# Patient Record
Sex: FEMALE | Race: ASIAN | NOT HISPANIC OR LATINO | Employment: OTHER | URBAN - METROPOLITAN AREA
[De-identification: names, ages, dates, MRNs, and addresses within clinical notes are randomized per-mention and may not be internally consistent; named-entity substitution may affect disease eponyms.]

---

## 2017-05-12 DIAGNOSIS — R42 DIZZINESS: ICD-10-CM

## 2017-05-12 RX ORDER — NICOTINE POLACRILEX 4 MG/1
20 GUM, CHEWING ORAL DAILY
Qty: 90 TABLET | OUTPATIENT
Start: 2017-05-12

## 2017-05-12 NOTE — TELEPHONE ENCOUNTER
Called and spoke with a pharmacist from [facility] to d/c the omeprazole and that patient needs an appointment if still having acid reflux symptoms.

## 2017-05-25 ENCOUNTER — OFFICE VISIT (OUTPATIENT)
Dept: FAMILY MEDICINE | Facility: CLINIC | Age: 82
End: 2017-05-25

## 2017-05-25 VITALS
HEART RATE: 66 BPM | TEMPERATURE: 97.9 F | BODY MASS INDEX: 23.13 KG/M2 | SYSTOLIC BLOOD PRESSURE: 166 MMHG | RESPIRATION RATE: 16 BRPM | WEIGHT: 107.2 LBS | HEIGHT: 57 IN | DIASTOLIC BLOOD PRESSURE: 75 MMHG | OXYGEN SATURATION: 98 %

## 2017-05-25 DIAGNOSIS — R52 GENERALIZED PAIN: ICD-10-CM

## 2017-05-25 DIAGNOSIS — I10 ESSENTIAL HYPERTENSION: Primary | ICD-10-CM

## 2017-05-25 DIAGNOSIS — K21.9 GASTROESOPHAGEAL REFLUX DISEASE, ESOPHAGITIS PRESENCE NOT SPECIFIED: ICD-10-CM

## 2017-05-25 DIAGNOSIS — Z23 IMMUNIZATION DUE: ICD-10-CM

## 2017-05-25 RX ORDER — ACETAMINOPHEN 325 MG/1
650 TABLET ORAL EVERY 4 HOURS PRN
Qty: 100 TABLET | Refills: 1 | Status: SHIPPED | OUTPATIENT
Start: 2017-05-25 | End: 2017-08-14

## 2017-05-25 RX ORDER — NICOTINE POLACRILEX 4 MG/1
20 GUM, CHEWING ORAL DAILY
Qty: 90 TABLET | Status: CANCELLED | OUTPATIENT
Start: 2017-05-25

## 2017-05-25 RX ORDER — LOSARTAN POTASSIUM 50 MG/1
50 TABLET ORAL DAILY
Qty: 30 TABLET | Refills: 3 | Status: SHIPPED | OUTPATIENT
Start: 2017-05-25 | End: 2017-07-20

## 2017-05-25 NOTE — PROGRESS NOTES
"       HPI:       Ranjeet Toure is a 82 year old  female with a significant past medical history of HTN who presents for the new concern(s) of:    #Med refills:  Out of meds for several months.  Not taking BP meds, also out of omeprazole  -No headaches, chest pain or blurry vision  -Lives at home with family    GI hx:  No history of ulcer or GI bleed, on omeprazole since 2015.  No reflux symptoms since being off of this the last several months.  No black/bloody stools, no coffee ground emesis/hematemesis         PMHX:     Patient Active Problem List   Diagnosis     Benign essential hypertension       Current Outpatient Prescriptions   Medication Sig Dispense Refill   Not taking medications currently     Allergies   Allergen Reactions     No Known Allergies        No results found for this or any previous visit (from the past 24 hour(s)).         Review of Systems:   5-Point Review of Systems Negative -- Except as noted above.          Physical Exam:     Vitals:    05/25/17 1011   BP: 166/75   Pulse: 66   Resp: 16   Temp: 97.9  F (36.6  C)   TempSrc: Oral   SpO2: 98%   Weight: 107 lb 3.2 oz (48.6 kg)   Height: 4' 9\" (144.8 cm)     Body mass index is 23.2 kg/(m^2).    Gen alert NAD  Heart rrr no murmurs  Lungs clear no wheezing or crackles  Abd soft nontender  Ext no swelling or calf tenderness  Neuro very hard of hearing, CN grossly normal, ambulating with cane      Assessment and Plan     (I10) Essential hypertension  (primary encounter diagnosis)  Comment: out of meds so elevated today.  She is rather frail and BP 160s so will start with losartan only.  Had been on amlodipine + losartan in the past but will start with 1 agent to avoid orthostasis/falls  Plan: losartan (COZAAR) 50 MG tablet        F/u 1 month for BP check and BMP    (K21.9) Gastroesophageal reflux disease, esophagitis presence not specified  Comment: on omeprazole since 2015, no indication to continue.  Reviewed risk of fractures with long term use, " recommend trial Zantac instead.  Pt agrees  Plan: ranitidine (ZANTAC) 300 MG tablet            Health maintenance:  Due for PCV 13 today, given        Options for treatment and follow-up care were reviewed with the patient and/or guardian. Ku Paw and/or guardian engaged in the decision making process and verbalized understanding of the options discussed and agreed with the final plan.      Shayy Foote MD      Precepted today with: Dr Segovia

## 2017-05-25 NOTE — MR AVS SNAPSHOT
After Visit Summary   2017    Ranjeet Toure    MRN: [id number]           Patient Information     Date Of Birth          1935        Visit Information        Provider Department      2017 10:00 AM Shayy Foote MD [facility]        Today's Diagnoses     Essential hypertension    -  1    Gastroesophageal reflux disease, esophagitis presence not specified        Generalized pain        Immunization due           Follow-ups after your visit        Who to contact     Please call your clinic at [phone] to:    Ask questions about your health    Make or cancel appointments    Discuss your medicines    Learn about your test results    Speak to your doctor   If you have compliments or concerns about an experience at your clinic, or if you wish to file a complaint, please contact [facility] Patient Relations at [phone] or email us at [email or web address]         Additional Information About Your Visit        MyChart Information     BuyBoxt is an electronic gateway that provides easy, online access to your medical records. With TradeBeam, you can request a clinic appointment, read your test results, renew a prescription or communicate with your care team.     To sign up for BuyBoxt visit the website at [email or web address]   You will be asked to enter the access code listed below, as well as some personal information. Please follow the directions to create your username and password.     Your access code is: [email or web address]  Expires: 2017 10:46 AM     Your access code will  in 90 days. If you need help or a new code, please contact your [facility] or call [phone] for assistance.        Care EveryWhere ID     This is your Care EveryWhere ID. This could be used by other organizations to access your [place] medical records  [id number]        Your Vitals Were     Pulse Temperature Respirations  "Height Pulse Oximetry BMI (Body Mass Index)    66 97.9  F (36.6  C) (Oral) 16 4' 9\" (144.8 cm) 98% 23.2 kg/m2       Blood Pressure from Last 3 Encounters:   05/25/17 166/75   05/23/16 133/76   04/25/16 181/74    Weight from Last 3 Encounters:   05/25/17 107 lb 3.2 oz (48.6 kg)   05/23/16 110 lb (49.9 kg)   04/25/16 107 lb 12.8 oz (48.9 kg)              We Performed the Following     ADMIN VACCINE, INITIAL     Pneumococcal vaccine 13 valent PCV13 IM (Prevnar) [52113]          Today's Medication Changes          These changes are accurate as of: 5/25/17 10:46 AM.  If you have any questions, ask your nurse or doctor.               Start taking these medicines.        Dose/Directions    ranitidine 300 MG tablet   Commonly known as:  ZANTAC   Used for:  Gastroesophageal reflux disease, esophagitis presence not specified   Started by:  Shayy Foote MD        Dose:  300 mg   Take 1 tablet (300 mg) by mouth At Bedtime   Quantity:  30 tablet   Refills:  1         These medicines have changed or have updated prescriptions.        Dose/Directions    * acetaminophen 325 MG tablet   Commonly known as:  TYLENOL   This may have changed:  Another medication with the same name was added. Make sure you understand how and when to take each.   Used for:  Knee pain   Changed by:  Shayy Foote MD        Dose:  650 mg   Take 2 tablets (650 mg) by mouth every 6 hours as needed for mild pain   Quantity:  100 tablet   Refills:  0       * acetaminophen 325 MG tablet   Commonly known as:  TYLENOL   This may have changed:  You were already taking a medication with the same name, and this prescription was added. Make sure you understand how and when to take each.   Used for:  Generalized pain   Changed by:  Shayy Foote MD        Dose:  650 mg   Take 2 tablets (650 mg) by mouth every 4 hours as needed for mild pain   Quantity:  100 tablet   Refills:  1       losartan 50 MG tablet   Commonly known as:  COZAAR   This may have " changed:    - medication strength  - how much to take   Used for:  Essential hypertension   Changed by:  Shayy Foote MD        Dose:  50 mg   Take 1 tablet (50 mg) by mouth daily   Quantity:  30 tablet   Refills:  3       * Notice:  This list has 2 medication(s) that are the same as other medications prescribed for you. Read the directions carefully, and ask your doctor or other care provider to review them with you.      Stop taking these medicines if you haven't already. Please contact your care team if you have questions.     amLODIPine 5 MG tablet   Commonly known as:  NORVASC   Stopped by:  Shayy Foote MD           ipratropium - albuterol 0.5 mg/2.5 mg/3 mL 0.5-2.5 (3) MG/3ML neb solution   Commonly known as:  DUONEB   Stopped by:  Shayy Foote MD           omeprazole 20 MG tablet   Stopped by:  Shayy Foote MD           order for DME   Stopped by:  Shayy Foote MD           potassium chloride 10 MEQ tablet   Commonly known as:  K-TAB,KLOR-CON   Stopped by:  Shayy Foote MD           SENNA PLUS PO   Stopped by:  Shayy Foote MD           simvastatin 20 MG tablet   Commonly known as:  ZOCOR   Stopped by:  Shayy Foote MD                Where to get your medicines      These medications were sent to [id number] - [place], [place] - [address]  [address]  [address] , [place] [address]     Phone:  [phone]     acetaminophen 325 MG tablet    losartan 50 MG tablet    ranitidine 300 MG tablet                Primary Care Provider Office Phone # Fax #    Shayy Foote [phone]       [facility] [address] [address]        Thank you!     Thank you for choosing [facility]  for your care. Our goal is always to provide you with excellent care. Hearing back from our patients is one way we can continue to improve our services. Please take a few minutes to complete  the written survey that you may receive in the mail after your visit with us. Thank you!             Your Updated Medication List - Protect others around you: Learn how to safely use, store and throw away your medicines at www.disposemymeds.org.          This list is accurate as of: 5/25/17 10:46 AM.  Always use your most recent med list.                   Brand Name Dispense Instructions for use    * acetaminophen 325 MG tablet    TYLENOL    100 tablet    Take 2 tablets (650 mg) by mouth every 6 hours as needed for mild pain       * acetaminophen 325 MG tablet    TYLENOL    100 tablet    Take 2 tablets (650 mg) by mouth every 4 hours as needed for mild pain       losartan 50 MG tablet    COZAAR    30 tablet    Take 1 tablet (50 mg) by mouth daily       ranitidine 300 MG tablet    ZANTAC    30 tablet    Take 1 tablet (300 mg) by mouth At Bedtime       * Notice:  This list has 2 medication(s) that are the same as other medications prescribed for you. Read the directions carefully, and ask your doctor or other care provider to review them with you.

## 2017-05-25 NOTE — PROGRESS NOTES
Preceptor Attestation:  Patient's case reviewed and discussed with Shayy Foote MD Patient seen and discussed with the resident.. I agree with assessment and plan of care.  Supervising Physician:  Doris Segovia DO  [facility]

## 2017-07-20 ENCOUNTER — CARE COORDINATION (OUTPATIENT)
Dept: FAMILY MEDICINE | Facility: CLINIC | Age: 82
End: 2017-07-20

## 2017-07-20 ENCOUNTER — OFFICE VISIT (OUTPATIENT)
Dept: FAMILY MEDICINE | Facility: CLINIC | Age: 82
End: 2017-07-20

## 2017-07-20 VITALS
TEMPERATURE: 98.2 F | HEART RATE: 67 BPM | DIASTOLIC BLOOD PRESSURE: 77 MMHG | BODY MASS INDEX: 23.73 KG/M2 | WEIGHT: 110 LBS | OXYGEN SATURATION: 95 % | HEIGHT: 57 IN | SYSTOLIC BLOOD PRESSURE: 147 MMHG

## 2017-07-20 DIAGNOSIS — I10 ESSENTIAL HYPERTENSION: ICD-10-CM

## 2017-07-20 DIAGNOSIS — K21.9 GASTROESOPHAGEAL REFLUX DISEASE, ESOPHAGITIS PRESENCE NOT SPECIFIED: ICD-10-CM

## 2017-07-20 PROBLEM — H04.129 TEAR FILM INSUFFICIENCY: Status: ACTIVE | Noted: 2017-07-20

## 2017-07-20 PROBLEM — H91.90 HEARING LOSS: Status: ACTIVE | Noted: 2017-07-20

## 2017-07-20 PROBLEM — N18.9 CHRONIC RENAL FAILURE: Status: ACTIVE | Noted: 2017-07-20

## 2017-07-20 PROBLEM — B18.1 CHRONIC HEPATITIS B VIRUS INFECTION (H): Status: ACTIVE | Noted: 2017-07-20

## 2017-07-20 LAB
BUN SERPL-MCNC: 20 MG/DL (ref 7–30)
CALCIUM SERPL-MCNC: 8.7 MG/DL (ref 8.5–10.4)
CHLORIDE SERPLBLD-SCNC: 105 MMOL/L (ref 94–109)
CO2 SERPL-SCNC: 25 MMOL/L (ref 20–32)
CREAT SERPL-MCNC: 1.1 MG/DL (ref 0.6–1.3)
EGFR CALCULATED (BLACK REFERENCE): 61.2 ML/MIN
EGFR CALCULATED (NON BLACK REFERENCE): 50.5 ML/MIN
GLUCOSE SERPL-MCNC: 141 MG/DL (ref 60–109)
POTASSIUM SERPL-SCNC: 3.2 MMOL/L (ref 3.4–5.3)
SODIUM SERPL-SCNC: 134 MMOL/L (ref 133–144)

## 2017-07-20 RX ORDER — DIPHENHYDRAMINE HCL 25 MG
1-2 CAPSULE ORAL 4 TIMES DAILY PRN
COMMUNITY
End: 2022-07-13

## 2017-07-20 RX ORDER — LOSARTAN POTASSIUM 50 MG/1
25 TABLET ORAL DAILY
Qty: 30 TABLET | Refills: 3 | Status: SHIPPED | OUTPATIENT
Start: 2017-07-20 | End: 2017-08-14

## 2017-07-20 RX ORDER — ALBUTEROL SULFATE 90 UG/1
2 AEROSOL, METERED RESPIRATORY (INHALATION) EVERY 4 HOURS PRN
COMMUNITY
Start: 2015-03-24 | End: 2018-09-18

## 2017-07-20 NOTE — PROGRESS NOTES
Preceptor Attestation:  Patient's case reviewed and discussed with Amie Lockwood MD resident and I evaluated the patient. I agree with written assessment and plan of care.  Supervising Physician:  Fouzia Brand   [facility]

## 2017-07-20 NOTE — PROGRESS NOTES
Met with pt in clinic per MD request-  Pt had been non compliant with medications concerned when BP was high.  Insurance had gone inactive and for this reason pt had not been taking medication, insurance is now active again- Called the pharmacy to verify and to see if there were any other barriers preventing dispensing medications to pt  No concerns     Communicated with pt via  in clinic today    Lanceetz

## 2017-07-20 NOTE — PROGRESS NOTES
BMP fairly unremarkable other than GFR of 50.5; has fluctuated in the past and mildly low K of 3.2. Glucose elevated. Repeat BMP and add HgbA1c at follow up visit.

## 2017-07-20 NOTE — PROGRESS NOTES
KATIE Toure is a 82 year old female with a history of HTN, GERD, bilateral OA of the knees, who presents for:  Chief Complaint   Patient presents with     Hypertension     follow up     HTN  Patient was last seen 5/25/17 at which time she was restarted on Losartan 50mg daily. Patient was previously on Losartan and Amlodipine, ran out of medication, and was restarted on only the losartan given she is rather frail and BP in the 160's.    Since last visit,    HTN:  - pt's BP today was 147/77 - pt hasn't been on medications for 3 weeks now due to insurance problems   - before that, pt reports taking her medication everyday   - endorses dizzness when standing up and/or sitting down   - Fell twice 3 months ago after standing up in the: 1) bathroom 2) bedroom   - water intake: 1 cup/day   - no HA, leg swelling, chest pain      GERD:  - medication seems to help when she took it back in May  - endorses burning, chest pain from the acid reflux  - denies vomiting or coffee ground (bloody) emesis   - no black/bloody stools  - no weight loss (gained 3 lbs since last visit)      Social history:  Currently lives with her brother's family (12 members total)  Receives help taking her medications from her nieces and nephews      A Kyron  was used for this visit.     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     Priority: Medium     Overview:   Created by Conversion    Replacement Utility updated for latest IMO load       Hearing loss 07/20/2017     Priority: Medium     Overview:   Created by Conversion    Replacement Utility updated for latest IMO load       Osteoarthrosis involving lower leg 07/20/2017     Priority: Medium     Overview:   Created by Conversion    Replacement Utility updated for  "latest IMO load       Benign essential hypertension 07/22/2015     Priority: Medium       Current Outpatient Prescriptions on File Prior to Visit:  acetaminophen (TYLENOL) 325 MG tablet Take 2 tablets (650 mg) by mouth every 4 hours as needed for mild pain   [DISCONTINUED] losartan (COZAAR) 50 MG tablet Take 1 tablet (50 mg) by mouth daily     No current facility-administered medications on file prior to visit.        Allergies   Allergen Reactions     No Known Allergies             Review of Systems:   Complete ROS completed, negative except for as above in HPI            Physical Exam:     Vitals:    07/20/17 1425 07/20/17 1427   BP: 156/75 147/77   BP Location: Right arm Right arm   Patient Position: Chair Chair   Cuff Size: Adult Regular Adult Regular   Pulse: 67    Temp: 98.2  F (36.8  C)    TempSrc: Oral    SpO2: 95%    Weight: 110 lb (49.9 kg)    Height: 4' 9\" (144.8 cm)      Body mass index is 23.8 kg/(m^2).  Vitals were reviewed and were normal except for mildly elevated BP    General: Alert and orientated in NAD. Pleasant and cooperative.   HEENT: EOMI, sclera without injection or icterus. Mucus membranes moist  Cards: RRR, no murmurs, rubs or gallops. No lower extremity edema  Resp: clear vesicular breath sounds bilaterally, no crackles or wheezes. No increased work of breathing  MSK: moving all extremities w/o difficulty or deficit  Skin: no rashes, lesions, or lacerations  Psych: mood good, affect appropriate    Assessment and Plan     HTN  Given patient's age, BP goal is <150/90 (creatinine and GFR unremarkable even though chronic renal failure noted in problem list, no hx of DM). Patient has had a couple falls and some orthostatic symptoms, however, orthostatics here in clinic were wnl. BP today above goal, above goal in May as well. Previously prescribed Losartan 50mg daily but has not taken in the last few weeks. Fine balance between risks and benefits for this patient.  - losartan (COZAAR) 50 MG " tablet; Take 0.5 tablets (25 mg) by mouth daily  Dispense: 30 tablet; Refill: 3  - [facility] - Results < 1 hr  - Misc. Devices (TABLET CUTTER) MISC; 1 Units as needed  Dispense: 1 each; Refill: 1    2. Gastroesophageal reflux disease, esophagitis presence not specified  No red alarm symptoms.  - ranitidine (ZANTAC) 300 MG tablet; Take 1 tablet (300 mg) by mouth At Bedtime  Dispense: 30 tablet; Refill: 1      Options for treatment and follow-up care were reviewed with the patient. Ranjeet Toure  engaged in the decision making process and verbalized understanding of the options discussed and agreed with the final plan.    Miguel Rubi, MS3 acting as a medical scribe for Dr. Fabián CAPPS (Amie Lockwood) have reviewed the documentation and am in agreement.    Precepted with: Dr. Sunday Lockwood MD (PGY2)  Pager: [phone]  [facility] Resident

## 2017-07-20 NOTE — MR AVS SNAPSHOT
After Visit Summary   2017    Ranjeet Toure    MRN: [id number]           Patient Information     Date Of Birth          1935        Visit Information        Provider Department      2017 2:20 PM Amie Lockwood MD [facility]        Today's Diagnoses     Essential hypertension        Gastroesophageal reflux disease, esophagitis presence not specified           Follow-ups after your visit        Who to contact     Please call your clinic at [phone] to:    Ask questions about your health    Make or cancel appointments    Discuss your medicines    Learn about your test results    Speak to your doctor   If you have compliments or concerns about an experience at your clinic, or if you wish to file a complaint, please contact [facility] Patient Relations at [phone] or email us at [email or web address]         Additional Information About Your Visit        MyChart Information     Shopzilla is an electronic gateway that provides easy, online access to your medical records. With Shopzilla, you can request a clinic appointment, read your test results, renew a prescription or communicate with your care team.     To sign up for Anchiva Systemst visit the website at [email or web address]   You will be asked to enter the access code listed below, as well as some personal information. Please follow the directions to create your username and password.     Your access code is: [email or web address]  Expires: 2017 10:46 AM     Your access code will  in 90 days. If you need help or a new code, please contact your [facility] or call [phone] for assistance.        Care EveryWhere ID     This is your Care EveryWhere ID. This could be used by other organizations to access your [place] medical records  [id number]        Your Vitals Were     Pulse Temperature Height Pulse Oximetry BMI (Body Mass Index)       67 98.2  F  "(36.8  C) (Oral) 4' 9\" (144.8 cm) 95% 23.8 kg/m2        Blood Pressure from Last 3 Encounters:   07/20/17 147/77   05/25/17 166/75   05/23/16 133/76    Weight from Last 3 Encounters:   07/20/17 110 lb (49.9 kg)   05/25/17 107 lb 3.2 oz (48.6 kg)   05/23/16 110 lb (49.9 kg)              We Performed the Following     Basic Metabolic Panel ([facility]) - Results < 1 hr          Today's Medication Changes          These changes are accurate as of: 7/20/17  3:12 PM.  If you have any questions, ask your nurse or doctor.               Start taking these medicines.        Dose/Directions    Tablet Cutter Misc   Used for:  Essential hypertension        Dose:  1 Units   1 Units as needed   Quantity:  1 each   Refills:  1         These medicines have changed or have updated prescriptions.        Dose/Directions    losartan 50 MG tablet   Commonly known as:  COZAAR   This may have changed:  how much to take   Used for:  Essential hypertension        Dose:  25 mg   Take 0.5 tablets (25 mg) by mouth daily   Quantity:  30 tablet   Refills:  3            Where to get your medicines      These medications were sent to [facility] - [place], [place] - [address] [place] [address] RD [address]  [address] , [place] [address]     Phone:  [phone]     losartan 50 MG tablet    ranitidine 300 MG tablet    Tablet Cutter Misc                Primary Care Provider Office Phone # Fax #    Amie Lockwood [phone]       [facility] [address] [place] [address]        Equal Access to Services     [facility] SUDHAKAR AH: Hadii aad ku hadasho Soomaali, waaxda luqadaha, qaybta kaalmada adeegyada, waxhuma colby. So [facility] [phone].    ATENCIÓN: Si habla español, tiene a mooney disposición servicios gratuitos de asistencia lingüística. Jelly reddy [phone].    We comply with applicable federal civil rights laws and [place] laws. We do not discriminate " on the basis of race, color, national origin, age, disability sex, sexual orientation or gender identity.            Thank you!     Thank you for choosing [facility]  for your care. Our goal is always to provide you with excellent care. Hearing back from our patients is one way we can continue to improve our services. Please take a few minutes to complete the written survey that you may receive in the mail after your visit with us. Thank you!             Your Updated Medication List - Protect others around you: Learn how to safely use, store and throw away your medicines at www.disposemymeds.org.          This list is accurate as of: 7/20/17  3:12 PM.  Always use your most recent med list.                   Brand Name Dispense Instructions for use Diagnosis    acetaminophen 325 MG tablet    TYLENOL    100 tablet    Take 2 tablets (650 mg) by mouth every 4 hours as needed for mild pain    Generalized pain       albuterol 108 (90 BASE) MCG/ACT Inhaler    PROAIR HFA/PROVENTIL HFA/VENTOLIN HFA     Inhale 2 puffs into the lungs every 4 hours as needed        hypromellose-dextran 0.3-0.1% opthalmic solution      Apply 1-2 drops to eye 4 times daily as needed        losartan 50 MG tablet    COZAAR    30 tablet    Take 0.5 tablets (25 mg) by mouth daily    Essential hypertension       ranitidine 300 MG tablet    ZANTAC    30 tablet    Take 1 tablet (300 mg) by mouth At Bedtime    Gastroesophageal reflux disease, esophagitis presence not specified       Tablet Cutter Misc     1 each    1 Units as needed    Essential hypertension

## 2017-08-14 ENCOUNTER — OFFICE VISIT (OUTPATIENT)
Dept: FAMILY MEDICINE | Facility: CLINIC | Age: 82
End: 2017-08-14

## 2017-08-14 VITALS
HEART RATE: 64 BPM | SYSTOLIC BLOOD PRESSURE: 151 MMHG | WEIGHT: 111 LBS | TEMPERATURE: 98 F | HEIGHT: 56 IN | DIASTOLIC BLOOD PRESSURE: 80 MMHG | BODY MASS INDEX: 24.97 KG/M2 | OXYGEN SATURATION: 97 % | RESPIRATION RATE: 18 BRPM

## 2017-08-14 DIAGNOSIS — M54.5 CHRONIC MIDLINE LOW BACK PAIN, WITH SCIATICA PRESENCE UNSPECIFIED: ICD-10-CM

## 2017-08-14 DIAGNOSIS — G89.29 CHRONIC MIDLINE LOW BACK PAIN, WITH SCIATICA PRESENCE UNSPECIFIED: ICD-10-CM

## 2017-08-14 DIAGNOSIS — I10 BENIGN ESSENTIAL HYPERTENSION: Primary | ICD-10-CM

## 2017-08-14 RX ORDER — ACETAMINOPHEN 500 MG
1000 TABLET ORAL 2 TIMES DAILY PRN
Qty: 100 TABLET | Refills: 3 | Status: SHIPPED | OUTPATIENT
Start: 2017-08-14 | End: 2022-07-13

## 2017-08-14 RX ORDER — LOSARTAN POTASSIUM 50 MG/1
50 TABLET ORAL DAILY
Qty: 30 TABLET | Refills: 3 | Status: SHIPPED | OUTPATIENT
Start: 2017-08-14 | End: 2017-09-12

## 2017-08-14 NOTE — PROGRESS NOTES
KATIE Toure is a 82 year old female who presents for:  Chief Complaint   Patient presents with     RECHECK     BP F/U - No other concerns.      Back Pain     Pain is getting worse.      HTN  5/25/17: BP was 166/75. Restarted on Losartan 50mg daily. (Patient was previously on Losartan and Amlodipine, ran out of medication, and was restarted on only the losartan given she is rather frail and BP in the 160's.)  7/20/17: BP was 147/77. Restarted back on Losartan 25mg daily as she had been out of the medication since the May visit.  Since that visit she has been taking the medication daily and tolerating it well.  No hypotensive symptoms.    Back pain  Has been episodic over the last several years. Current episode present for the last couple days. No recent fall. No saddle anesthesia, no loss of bowel or bladder, no difficulties with ambulation/leg weakness. Has not tried anything at home.    A Bia  was used for this visit.     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     Priority: Medium     Overview:   Created by Conversion    Replacement Utility updated for latest IMO load       Hearing loss 07/20/2017     Priority: Medium     Overview:   Created by Conversion    Replacement Utility updated for latest IMO load       Osteoarthrosis involving lower leg 07/20/2017     Priority: Medium     Overview:   Created by Conversion    Replacement Utility updated for latest IMO load       Benign essential hypertension 07/22/2015     Priority: Medium         Current Outpatient Prescriptions on File Prior to Visit:  losartan (COZAAR) 50 MG tablet Take 0.5 tablets (25 mg) by mouth daily   ranitidine (ZANTAC) 300 MG tablet Take 1 tablet (300 mg) by mouth At Bedtime   albuterol (PROAIR HFA/PROVENTIL  "HFA/VENTOLIN HFA) 108 (90 BASE) MCG/ACT Inhaler Inhale 2 puffs into the lungs every 4 hours as needed   hypromellose-dextran 0.3-0.1% (ARTIFICIAL TEARS) opthalmic solution Apply 1-2 drops to eye 4 times daily as needed   Misc. Devices (TABLET CUTTER) MISC 1 Units as needed     No current facility-administered medications on file prior to visit.        Allergies   Allergen Reactions     No Known Allergies             Review of Systems:   Complete ROS completed, negative except for as above in HPI            Physical Exam:     Vitals:    08/14/17 1046 08/14/17 1049   BP: 192/75 183/78   BP Location: Right arm Right arm   Patient Position: Chair Chair   Cuff Size: Adult Regular Adult Regular   Pulse: 64    Resp: 18    Temp: 98  F (36.7  C)    TempSrc: Oral    SpO2: 97%    Weight: 111 lb (50.3 kg)    Height: 4' 8\" (142.2 cm)      Body mass index is 24.89 kg/(m^2).  Vital signs normal except for elevated blood pressure.    General: Alert and orientated in NAD. Pleasant and cooperative.   HEENT: EOMI, sclera without injection or icterus. Mucus membranes moist  Cards: RRR, no murmurs, rubs or gallops. No lower extremity edema  Resp: clear vesicular breath sounds bilaterally, no crackles or wheezes. No increased work of breathing  MSK: no point tenderness over the thoracic and lumbar spine. No leg weakness. Possible positive right leg raise.  Skin: no rashes, lesions, or lacerations  Psych: mood good, affect appropriate    Assessment and Plan     1. Benign essential hypertension  BP goal <150/90. Patient nearly at goal today (151/80). Was more elevated during ambulation (192/75). Currently on Losartan 25mg daily and tolerating this well.  - Increase Losartan: losartan (COZAAR) 50 MG tablet; Take 1 tablet (50 mg) by mouth daily  Dispense: 30 tablet; Refill: 3    2. Chronic midline low back pain, with sciatica presence unspecified  No red alarm symptoms. Acute on chronic. No point tenderness. No lower extremity weakness. " "Possible positive left leg raise. Strongly encouraged PT for both back pain and for hx of falls (2 in the last year) and failed \"get up go\" screen today in clinic, but patient refused.  - acetaminophen (TYLENOL) 500 MG tablet; Take 2 tablets (1,000 mg) by mouth 2 times daily as needed for mild pain  Dispense: 100 tablet; Refill: 3    Follow up in clinic in 2-4 weeks.    Options for treatment and follow-up care were reviewed with the patient. Ranjeet Toure  engaged in the decision making process and verbalized understanding of the options discussed and agreed with the final plan.    Precepted with: MD Amie Carreon MD (PGY2)  Pager: [phone]  [facility] Resident          "

## 2017-08-14 NOTE — MR AVS SNAPSHOT
After Visit Summary   2017    Ranjeet Toure    MRN: [id number]           Patient Information     Date Of Birth          1935        Visit Information        Provider Department      2017 10:40 AM Amie Lockwood MD [facility]        Today's Diagnoses     Benign essential hypertension    -  1    Chronic midline low back pain, with sciatica presence unspecified           Follow-ups after your visit        Your next 10 appointments already scheduled     Sep 12, 2017 11:00 AM CDT   Return Visit with Amie Lockwood MD   [facility] ([facility])    [address] [place] [address]   [phone]              Who to contact     Please call your clinic at [phone] to:    Ask questions about your health    Make or cancel appointments    Discuss your medicines    Learn about your test results    Speak to your doctor   If you have compliments or concerns about an experience at your clinic, or if you wish to file a complaint, please contact [facility] Patient Relations at [phone] or email us at [email or web address]         Additional Information About Your Visit        MyChart Information     WiNetworks is an electronic gateway that provides easy, online access to your medical records. With WiNetworks, you can request a clinic appointment, read your test results, renew a prescription or communicate with your care team.     To sign up for WiNetworks visit the website at [email or web address]   You will be asked to enter the access code listed below, as well as some personal information. Please follow the directions to create your username and password.     Your access code is: [email or web address]  Expires: 2017 10:46 AM     Your access code will  in 90 days. If you need help or a new code, please contact your [facility] or call [phone] for assistance.        Care EveryWhere  "ID     This is your Care EveryWhere ID. This could be used by other organizations to access your [facility] medical records  [id number]        Your Vitals Were     Pulse Temperature Respirations Height Pulse Oximetry BMI (Body Mass Index)    64 98  F (36.7  C) (Oral) 18 4' 8\" (142.2 cm) 97% 24.89 kg/m2       Blood Pressure from Last 3 Encounters:   08/14/17 151/80   07/20/17 147/77   05/25/17 166/75    Weight from Last 3 Encounters:   08/14/17 111 lb (50.3 kg)   07/20/17 110 lb (49.9 kg)   05/25/17 107 lb 3.2 oz (48.6 kg)              Today, you had the following     No orders found for display         Today's Medication Changes          These changes are accurate as of: 8/14/17 11:59 PM.  If you have any questions, ask your nurse or doctor.               These medicines have changed or have updated prescriptions.        Dose/Directions    acetaminophen 500 MG tablet   Commonly known as:  TYLENOL   This may have changed:    - medication strength  - how much to take  - when to take this   Used for:  Chronic midline low back pain, with sciatica presence unspecified   Changed by:  Amie Lockwood MD        Dose:  1000 mg   Take 2 tablets (1,000 mg) by mouth 2 times daily as needed for mild pain   Quantity:  100 tablet   Refills:  3       losartan 50 MG tablet   Commonly known as:  COZAAR   This may have changed:  how much to take   Changed by:  Amie Lockwood MD        Dose:  50 mg   Take 1 tablet (50 mg) by mouth daily   Quantity:  30 tablet   Refills:  3            Where to get your medicines      These medications were sent to [id number] - [place], [place] - [address] AT [address]  [address], [place] [address]     Phone:  [phone]     acetaminophen 500 MG tablet    losartan 50 MG tablet                Primary Care Provider Office Phone # Fax #    Amie Lockwood [phone]       [facility] [address]  [place] [address]        Equal Access to Services     [facility] : Hadii ines ku hadlove Somayte, wadonnyda luqadaha, qaybta kaalmada светлана, waxhuma olu steeledavisnate colby. So [facility] [place] [phone].    ATENCIÓN: Si habla español, tiene a mooney disposición servicios gratuitos de asistencia lingüística. Jelly al [phone].    We comply with applicable federal civil rights laws and [place] laws. We do not discriminate on the basis of race, color, national origin, age, disability sex, sexual orientation or gender identity.            Thank you!     Thank you for choosing [facility]  for your care. Our goal is always to provide you with excellent care. Hearing back from our patients is one way we can continue to improve our services. Please take a few minutes to complete the written survey that you may receive in the mail after your visit with us. Thank you!             Your Updated Medication List - Protect others around you: Learn how to safely use, store and throw away your medicines at www.disposemymeds.org.          This list is accurate as of: 8/14/17 11:59 PM.  Always use your most recent med list.                   Brand Name Dispense Instructions for use Diagnosis    acetaminophen 500 MG tablet    TYLENOL    100 tablet    Take 2 tablets (1,000 mg) by mouth 2 times daily as needed for mild pain    Chronic midline low back pain, with sciatica presence unspecified       albuterol 108 (90 BASE) MCG/ACT Inhaler    PROAIR HFA/PROVENTIL HFA/VENTOLIN HFA     Inhale 2 puffs into the lungs every 4 hours as needed        hypromellose-dextran 0.3-0.1% opthalmic solution      Apply 1-2 drops to eye 4 times daily as needed        losartan 50 MG tablet    COZAAR    30 tablet    Take 1 tablet (50 mg) by mouth daily        ranitidine 300 MG tablet    ZANTAC    30 tablet    Take 1 tablet (300 mg) by mouth At Bedtime    Gastroesophageal reflux disease, esophagitis presence not specified       Tablet  [facility]     1 each    1 Units as needed    Essential hypertension

## 2017-08-14 NOTE — NURSING NOTE
name: Teenay Mikael  Language: IZZY  Agency: [facility]  Phone number: [phone]

## 2017-08-14 NOTE — PROGRESS NOTES
Preceptor Attestation:  Patient's case reviewed and discussed with Amie Lockwood MD Patient seen and discussed with the resident.. I agree with assessment and plan of care.  Supervising Physician:  Cody Servin MD  [facility]

## 2017-09-12 ENCOUNTER — OFFICE VISIT (OUTPATIENT)
Dept: FAMILY MEDICINE | Facility: CLINIC | Age: 82
End: 2017-09-12

## 2017-09-12 VITALS
DIASTOLIC BLOOD PRESSURE: 76 MMHG | SYSTOLIC BLOOD PRESSURE: 175 MMHG | TEMPERATURE: 98.6 F | WEIGHT: 109.6 LBS | HEIGHT: 56 IN | BODY MASS INDEX: 24.65 KG/M2 | OXYGEN SATURATION: 98 % | RESPIRATION RATE: 22 BRPM | HEART RATE: 67 BPM

## 2017-09-12 DIAGNOSIS — I10 BENIGN ESSENTIAL HYPERTENSION: Primary | ICD-10-CM

## 2017-09-12 LAB
BUN SERPL-MCNC: 16 MG/DL (ref 7–30)
CALCIUM SERPL-MCNC: 9 MG/DL (ref 8.5–10.4)
CHLORIDE SERPLBLD-SCNC: 105 MMOL/L (ref 94–109)
CO2 SERPL-SCNC: 28 MMOL/L (ref 20–32)
CREAT SERPL-MCNC: 1 MG/DL (ref 0.6–1.3)
EGFR CALCULATED (BLACK REFERENCE): 68.3 ML/MIN
EGFR CALCULATED (NON BLACK REFERENCE): 56.4 ML/MIN
GLUCOSE SERPL-MCNC: 109 MG/DL (ref 60–109)
POTASSIUM SERPL-SCNC: 3.3 MMOL/L (ref 3.4–5.3)
SODIUM SERPL-SCNC: 142 MMOL/L (ref 133–144)

## 2017-09-12 RX ORDER — LOSARTAN POTASSIUM 100 MG/1
100 TABLET ORAL DAILY
Qty: 90 TABLET | Refills: 3 | Status: SHIPPED | OUTPATIENT
Start: 2017-09-12 | End: 2018-05-15

## 2017-09-12 RX ORDER — AMLODIPINE BESYLATE 2.5 MG/1
2.5 TABLET ORAL DAILY
Qty: 90 TABLET | Refills: 3 | Status: SHIPPED | OUTPATIENT
Start: 2017-09-12 | End: 2018-05-15

## 2017-09-12 NOTE — MR AVS SNAPSHOT
"              After Visit Summary   9/12/2017    Ranjete Toure    MRN: [id number]           Patient Information     Date Of Birth          1935        Visit Information        Provider Department      9/12/2017 11:00 AM Amie Lockwood MD [facility]        Today's Diagnoses     Benign essential hypertension    -  1       Follow-ups after your visit        Your next 10 appointments already scheduled     Oct 05, 2017  2:20 PM CDT   Return Visit with Amie Lockwood MD   [facility] ([facility])    [address] [place] [address]   [phone]              Who to contact     Please call your clinic at [phone] to:    Ask questions about your health    Make or cancel appointments    Discuss your medicines    Learn about your test results    Speak to your doctor   If you have compliments or concerns about an experience at your clinic, or if you wish to file a complaint, please contact [facility] Patient Relations at [phone] or email us at [email or web address]         Additional Information About Your Visit        Care EveryWhere ID     This is your Care EveryWhere ID. This could be used by other organizations to access your [facility] medical records  [id number]        Your Vitals Were     Pulse Temperature Respirations Height Pulse Oximetry BMI (Body Mass Index)    67 98.6  F (37  C) 22 4' 8\" (142.2 cm) 98% 24.57 kg/m2       Blood Pressure from Last 3 Encounters:   09/12/17 175/76   08/14/17 151/80   07/20/17 147/77    Weight from Last 3 Encounters:   09/12/17 109 lb 9.6 oz (49.7 kg)   08/14/17 111 lb (50.3 kg)   07/20/17 110 lb (49.9 kg)              We Performed the Following     Basic Metabolic Panel ([facility]) - Results < 1 hr          Today's Medication Changes          These changes are accurate as of: 9/12/17 11:59 PM.  If you have any questions, ask your nurse or doctor.               Start taking these medicines.     "    Dose/Directions    amLODIPine 2.5 MG tablet   Commonly known as:  NORVASC   Used for:  Benign essential hypertension   Started by:  Amie Lockwood MD        Dose:  2.5 mg   Take 1 tablet (2.5 mg) by mouth daily   Quantity:  90 tablet   Refills:  3         These medicines have changed or have updated prescriptions.        Dose/Directions    losartan 100 MG tablet   Commonly known as:  COZAAR   This may have changed:    - medication strength  - how much to take   Used for:  Benign essential hypertension   Changed by:  Amie Lockwood MD        Dose:  100 mg   Take 1 tablet (100 mg) by mouth daily   Quantity:  90 tablet   Refills:  3            Where to get your medicines      These medications were sent to [id number] - [place], [place] - [address] [place] [address] AT [address]  [address] , [place] [address]     Phone:  [phone]     amLODIPine 2.5 MG tablet    losartan 100 MG tablet                Primary Care Provider Office Phone # Fax #    Amie Lockwood [phone]       [facility] [address] [place] [address]        Equal Access to Services     [facility] AH: Hadii aad ku hadasho Soomaali, waaxda luqadaha, qaybta kaalmada adeegyada, johnathan wynnein hayaan cathy oneill . So [facility] [phone].    ATENCIÓN: Si habla español, tiene a mooney disposición servicios gratuitos de asistencia lingüística. LlSelect [facility] [phone].    We comply with applicable federal civil rights laws and [place] laws. We do not discriminate on the basis of race, color, national origin, age, disability sex, sexual orientation or gender identity.            Thank you!     Thank you for choosing [facility]  for your care. Our goal is always to provide you with excellent care. Hearing back from our patients is one way we can continue to improve our services. Please take a few minutes to complete the written survey that you may  receive in the mail after your visit with us. Thank you!             Your Updated Medication List - Protect others around you: Learn how to safely use, store and throw away your medicines at www.disposemymeds.org.          This list is accurate as of: 9/12/17 11:59 PM.  Always use your most recent med list.                   Brand Name Dispense Instructions for use Diagnosis    acetaminophen 500 MG tablet    TYLENOL    100 tablet    Take 2 tablets (1,000 mg) by mouth 2 times daily as needed for mild pain    Chronic midline low back pain, with sciatica presence unspecified       albuterol 108 (90 BASE) MCG/ACT Inhaler    PROAIR HFA/PROVENTIL HFA/VENTOLIN HFA     Inhale 2 puffs into the lungs every 4 hours as needed        amLODIPine 2.5 MG tablet    NORVASC    90 tablet    Take 1 tablet (2.5 mg) by mouth daily    Benign essential hypertension       hypromellose-dextran 0.3-0.1% opthalmic solution      Apply 1-2 drops to eye 4 times daily as needed        losartan 100 MG tablet    COZAAR    90 tablet    Take 1 tablet (100 mg) by mouth daily    Benign essential hypertension       ranitidine 300 MG tablet    ZANTAC    30 tablet    Take 1 tablet (300 mg) by mouth At Bedtime    Gastroesophageal reflux disease, esophagitis presence not specified       Tablet Cutter Misc     1 each    1 Units as needed    Essential hypertension

## 2017-09-12 NOTE — PROGRESS NOTES
"       KATIE Toure is a 82 year old female who presents for:    HTN  5/25/17: BP was 166/75. Prescribed Losartan 50mg daily. (Patient was previously on Losartan and Amlodipine, ran out of medication, and was restarted on only the losartan given she is rather frail and BP in the 160's.) She was noncompliant with this.  7/20/17: BP was 147/77. Restarted back on Losartan 25mg daily as she had been out of the medication since the May visit and BP was only mildly elevated.  8/14/17: BP was 151/80, 192/75. Losartan was increased from 25mg to 50mg daily.    Since then patient has been taking her medication daily. Her niece is present and helps her take her medication. No chest pain, no dyspnea, no orthostatic symptoms while on the blood pressure medication; no hypotensive symptoms.. No new questions or concerns.    A SparkLix  was used for this visit.     ROS: Complete 6 point ROS completed and negative other than stated above.    Social: Niece helps her with her medications and transportation.         Physical Exam:     Vitals:    09/12/17 1054 09/12/17 1057   BP: 183/72 175/76   Pulse: 67    Resp: 22    Temp: 98.6  F (37  C)    SpO2: 98%    Weight: 109 lb 9.6 oz (49.7 kg)    Height: 4' 8\" (142.2 cm)      Body mass index is 24.57 kg/(m^2).  Vital signs normal except for elevated blood pressure.    General: Alert and orientated in NAD. Pleasant and cooperative.   HEENT: EOMI, sclera without injection or icterus. Mucus membranes moist  Cards: RRR, no murmurs, rubs or gallops. No lower extremity edema  Resp: clear vesicular breath sounds bilaterally, no crackles or wheezes. No increased work of breathing  MSK: moving all extremities w/o difficulty or deficit  Skin: no rashes, lesions, or lacerations  Psych: mood good, affect appropriate        Results:       Assessment and Plan     1. Benign essential hypertension  BP goal <150/90. eGFR 50 in July 2017, warranting BP control. BP above goal today at 183/72, " 175/76. Currently on Losartan 50mg daily and tolerating this well with no hypotensive symptoms. No lower extremity edema on examination today.  - losartan (COZAAR) 100 MG tablet; Take 1 tablet (100 mg) by mouth daily  Dispense: 90 tablet; Refill: 3  - amLODIPine (NORVASC) 2.5 MG tablet; Take 1 tablet (2.5 mg) by mouth daily  Dispense: 90 tablet; Refill: 3  - patient advised to discontinue amlodipine if she has any hypotensive symptoms. Niece was in agreement.  - Basic Metabolic Panel ([facility]) - Results < 1 hr    Follow up in 2 weeks for further BP management.    Options for treatment and follow-up care were reviewed with the patient. Ranjeet Toure  engaged in the decision making process and verbalized understanding of the options discussed and agreed with the final plan.    Precepted with: MD Amie Ferguson MD (PGY2)  Pager: [phone]  [facility] Resident

## 2017-09-12 NOTE — NURSING NOTE
name: Velia  Language: Cris  Agency: [facility]  Phone number: [phone]

## 2017-09-12 NOTE — LETTER
September 12, 2017      Ranjeet Paw  [address] [place] [address]        Dear Ranjeet,    Your blood levels we obtained in clinic today looked good. Your electrolytes and kidneys are doing well and improved with the blood pressure medications we have started. Continue with your current plan of care.    Please see below for your test results.    Resulted Orders   Basic Metabolic Panel ([facility]) - Results < 1 hr   Result Value Ref Range    Glucose 109.0 60.0 - 109.0 mg/dL    Urea Nitrogen 16.0 7.0 - 30.0 mg/dL    Creatinine 1.0 0.6 - 1.3 mg/dL    Sodium 142.0 133.0 - 144.0 mmol/L    Potassium 3.3 (L) 3.4 - 5.3 mmol/L    Chloride 105.0 94.0 - 109.0 mmol/L    Carbon Dioxide 28.0 20.0 - 32.0 mmol/L    Calcium 9.0 8.5 - 10.4 mg/dL    eGFR Calculated (Non Black Reference) 56.4 (L) >60.0 mL/min    eGFR Calculated (Black Reference) 68.3 >60.0 mL/min       If you have any questions, please call the clinic to make an appointment.    Sincerely,    Amie Lockwood MD

## 2017-09-12 NOTE — PROGRESS NOTES
Preceptor Attestation:  Patient's case reviewed and discussed with Amie Lockwood MD resident and I evaluated the patient. I agree with written assessment and plan of care.  Supervising Physician:Samuel Gary MD    [facility]

## 2017-09-19 ENCOUNTER — MEDICAL CORRESPONDENCE (OUTPATIENT)
Dept: HEALTH INFORMATION MANAGEMENT | Facility: CLINIC | Age: 82
End: 2017-09-19

## 2017-10-05 ENCOUNTER — OFFICE VISIT (OUTPATIENT)
Dept: FAMILY MEDICINE | Facility: CLINIC | Age: 82
End: 2017-10-05

## 2017-10-05 VITALS
BODY MASS INDEX: 22.63 KG/M2 | WEIGHT: 107.8 LBS | TEMPERATURE: 98.3 F | RESPIRATION RATE: 18 BRPM | HEART RATE: 64 BPM | OXYGEN SATURATION: 96 % | HEIGHT: 58 IN | DIASTOLIC BLOOD PRESSURE: 75 MMHG | SYSTOLIC BLOOD PRESSURE: 149 MMHG

## 2017-10-05 DIAGNOSIS — I10 BENIGN ESSENTIAL HYPERTENSION: Primary | ICD-10-CM

## 2017-10-05 DIAGNOSIS — N18.30 CKD (CHRONIC KIDNEY DISEASE) STAGE 3, GFR 30-59 ML/MIN (H): ICD-10-CM

## 2017-10-05 DIAGNOSIS — Z23 NEEDS FLU SHOT: ICD-10-CM

## 2017-10-05 LAB
BUN SERPL-MCNC: 16 MG/DL (ref 7–30)
CALCIUM SERPL-MCNC: 9.6 MG/DL (ref 8.5–10.4)
CHLORIDE SERPLBLD-SCNC: 98 MMOL/L (ref 94–109)
CO2 SERPL-SCNC: 29 MMOL/L (ref 20–32)
CREAT SERPL-MCNC: 1.2 MG/DL (ref 0.6–1.3)
EGFR CALCULATED (BLACK REFERENCE): 55.3 ML/MIN
EGFR CALCULATED (NON BLACK REFERENCE): 45.7 ML/MIN
GLUCOSE SERPL-MCNC: 100 MG/DL (ref 60–109)
POTASSIUM SERPL-SCNC: 3.7 MMOL/L (ref 3.4–5.3)
SODIUM SERPL-SCNC: 137 MMOL/L (ref 133–144)

## 2017-10-05 NOTE — NURSING NOTE
" name: Marquita Rey  Language: Cris  Agency: [facility]  Phone number: [phone]    Pt. Agreed to flu shot      Injectable Influenza Immunization Documentation    1.  Has the patient received the information for the injectable influenza vaccine? YES     2. Is the patient 6 months of age or older? YES     3. Does the patient have any of the following contraindications?         Severe allergy to eggs? No     Severe allergic reaction to previous influenza vaccines? No   Severe allergy to latex? No       History of Guillain-[place] syndrome? No     Currently have a temperature greater than 100.4F? No        4.  Severely egg allergic patients should have flu vaccine eligibility assessed by an MD, RN, or pharmacist, and those who received flu vaccine should be observed for 15 min by an MD, RN, Pharmacist, Medical Technician, or member of clinic staff.\": YES    5. Latex-allergic patients should be given latex-free influenza vaccine Yes. Please reference the Vaccine latex table to determine if your clinic s product is latex-containing.     Vaccination given by Alexandra Kirkpatrick CMA          "

## 2017-10-05 NOTE — LETTER
October 6, 2017      Ranjeet Paw  [address] [place] [address]        Dear Alonzo Pollack,    Your blood test we took in clinic looks good. Continue with your current medications. Please call the clinic with any further questions or concerns.     Amie Lockwood MD    Please see below for your test results.    Resulted Orders   Basic Metabolic Panel ([facility]) - Results < 1 hr   Result Value Ref Range    Glucose 100.0 60.0 - 109.0 mg/dL    Urea Nitrogen 16.0 7.0 - 30.0 mg/dL    Creatinine 1.2 0.6 - 1.3 mg/dL    Sodium 137.0 133.0 - 144.0 mmol/L    Potassium 3.7 3.4 - 5.3 mmol/L    Chloride 98.0 94.0 - 109.0 mmol/L    Carbon Dioxide 29.0 20.0 - 32.0 mmol/L    Calcium 9.6 8.5 - 10.4 mg/dL    eGFR Calculated (Non Black Reference) 45.7 (L) >60.0 mL/min    eGFR Calculated (Black Reference) 55.3 (L) >60.0 mL/min       If you have any questions, please call the clinic to make an appointment.    Sincerely,    Amie Lockwood MD

## 2017-10-05 NOTE — MR AVS SNAPSHOT
"              After Visit Summary   10/5/2017    Ranjeet Toure    MRN: [id number]           Patient Information     Date Of Birth          1935        Visit Information        Provider Department      10/5/2017 2:20 PM Amie Lockwood MD [facility]        Today's Diagnoses     Benign essential hypertension    -  1    CKD (chronic kidney disease) stage 3, GFR 30-59 ml/min        Needs flu shot           Follow-ups after your visit        Who to contact     Please call your clinic at [phone] to:    Ask questions about your health    Make or cancel appointments    Discuss your medicines    Learn about your test results    Speak to your doctor   If you have compliments or concerns about an experience at your clinic, or if you wish to file a complaint, please contact [facility] Patient Relations at [phone] or email us at [email or web address]         Additional Information About Your Visit        Care EveryWhere ID     This is your Care EveryWhere ID. This could be used by other organizations to access your [facility] medical records  [id number]        Your Vitals Were     Pulse Temperature Respirations Height Pulse Oximetry BMI (Body Mass Index)    64 98.3  F (36.8  C) (Oral) 18 4' 9.75\" (146.7 cm) 96% 22.73 kg/m2       Blood Pressure from Last 3 Encounters:   10/05/17 149/75   09/12/17 175/76   08/14/17 151/80    Weight from Last 3 Encounters:   10/05/17 107 lb 12.8 oz (48.9 kg)   09/12/17 109 lb 9.6 oz (49.7 kg)   08/14/17 111 lb (50.3 kg)              We Performed the Following     ADMIN VACCINE, INITIAL     FLU VACCINE, INCREASED ANTIGEN, PRESV FREE        Primary Care Provider Office Phone # Fax #    Amie Lockwood [phone]       [facility] [address] [place] [address]        Equal Access to Services     VERA DE LA [facility]UENTE AH: Rose kelleyo Soomaali, waaxda luqadaha, qaybta kaalmada adeegyada, waxay idiin " tristin morgan cedricemiliana oneill ah. So [facility] [phone].    ATENCIÓN: Si maribella ibrahima, tiene a mooney disposición servicios gratuitos de asistencia lingüística. Jelly reddy [phone].    We comply with applicable federal civil rights laws and [place] laws. We do not discriminate on the basis of race, color, national origin, age, disability, sex, sexual orientation, or gender identity.            Thank you!     Thank you for choosing [facility]  for your care. Our goal is always to provide you with excellent care. Hearing back from our patients is one way we can continue to improve our services. Please take a few minutes to complete the written survey that you may receive in the mail after your visit with us. Thank you!             Your Updated Medication List - Protect others around you: Learn how to safely use, store and throw away your medicines at www.disposemymeds.org.          This list is accurate as of: 10/5/17  3:22 PM.  Always use your most recent med list.                   Brand Name Dispense Instructions for use Diagnosis    acetaminophen 500 MG tablet    TYLENOL    100 tablet    Take 2 tablets (1,000 mg) by mouth 2 times daily as needed for mild pain    Chronic midline low back pain, with sciatica presence unspecified       albuterol 108 (90 BASE) MCG/ACT Inhaler    PROAIR HFA/PROVENTIL HFA/VENTOLIN HFA     Inhale 2 puffs into the lungs every 4 hours as needed        amLODIPine 2.5 MG tablet    NORVASC    90 tablet    Take 1 tablet (2.5 mg) by mouth daily    Benign essential hypertension       hypromellose-dextran 0.3-0.1% opthalmic solution      Apply 1-2 drops to eye 4 times daily as needed        losartan 100 MG tablet    COZAAR    90 tablet    Take 1 tablet (100 mg) by mouth daily    Benign essential hypertension       ranitidine 300 MG tablet    ZANTAC    30 tablet    Take 1 tablet (300 mg) by mouth At Bedtime    Gastroesophageal reflux disease, esophagitis presence not specified       Tablet  Cutter [facility]     1 each    1 Units as needed    Essential hypertension

## 2017-10-05 NOTE — PROGRESS NOTES
Preceptor Attestation:  Patient's case reviewed and discussed with Amie Lockwood MD Patient seen and discussed with the resident.. I agree with assessment and plan of care.  Supervising Physician:  Davy Coughlin MD  [facility]

## 2017-10-05 NOTE — PROGRESS NOTES
"KATIE Toure is a 82 year old female who presents for:  Chief Complaint   Patient presents with     RECHECK     F/U: BP - no other concerns.        Essential HTN  Patient last seen 9/12 for her HTN. At that time her losartan was increased (50-->100mg) and she was started on Amlodipine 2.5mg daily d/t elevated blood pressures (170-180/70's). Since that visit she has been taking her blood pressure medications daily. Tolerating them well with no orthostasis, no falls.     A [facility]  was used for this visit     ROS: Complete 6 point ROS completed and negative other than stated above.    Social: Lives at home with son and grand-daughter; help with medications.         Physical Exam:     Vitals:    10/05/17 1431 10/05/17 1435   BP: 145/72 149/75   BP Location: Left arm Left arm   Patient Position: Chair Chair   Cuff Size: Adult Regular Adult Regular   Pulse: 64    Resp: 18    Temp: 98.3  F (36.8  C)    TempSrc: Oral    SpO2: 96%    Weight: 107 lb 12.8 oz (48.9 kg)    Height: 4' 9.75\" (146.7 cm)      Body mass index is 22.73 kg/(m^2).  Vital signs normal except for mildly elevated BP.    General: Thin, elderly appearing Cris speaking female. Alert and orientated in NAD. Pleasant and cooperative.   HEENT: EOMI, sclera without injection or icterus. Mucus membranes moist  Cards: RRR, no murmurs, rubs or gallops. No lower extremity edema  Resp: clear vesicular breath sounds bilaterally, no crackles or wheezes. No increased work of breathing  MSK: moving all extremities w/o difficulty or deficit  Skin: no rashes, lesions, or lacerations  Psych: mood good, affect appropriate        Results:     Results for orders placed or performed in visit on 10/05/17   Basic Metabolic Panel ([facility]) - Results < 1 hr   Result Value Ref Range    Glucose 100.0 60.0 - 109.0 mg/dL    Urea Nitrogen 16.0 7.0 - 30.0 mg/dL    Creatinine 1.2 0.6 - 1.3 mg/dL    Sodium 137.0 133.0 - 144.0 mmol/L    Potassium 3.7 3.4 - 5.3 mmol/L    " Chloride 98.0 94.0 - 109.0 mmol/L    Carbon Dioxide 29.0 20.0 - 32.0 mmol/L    Calcium 9.6 8.5 - 10.4 mg/dL    eGFR Calculated (Non Black Reference) 45.7 (L) >60.0 mL/min    eGFR Calculated (Black Reference) 55.3 (L) >60.0 mL/min         Assessment and Plan     Benign essential hypertension  BP goal <150/90. eGFR 56 Sept 2017, warranting BP goal adherence. BP at goal today. Tolerating medications well with no orthostatic symptoms. No lower extremity edema on examination today.  - continue on Losartan 100mg daily  - continue on amlodipine 2.5mg daily  - family counseled on concern for possible orthostasis    Needs flu shot  - FLU VACCINE, INCREASED ANTIGEN, PRESV FREE  - ADMIN VACCINE, INITIAL    Follow up in 2 months    Options for treatment and follow-up care were reviewed with the patient. Ranjeet Toure  engaged in the decision making process and verbalized understanding of the options discussed and agreed with the final plan.    Precepted with: MD Amie Guerrero MD (PGY2)  Pager: [phone]  [facility] Resident

## 2017-10-17 ENCOUNTER — MEDICAL CORRESPONDENCE (OUTPATIENT)
Dept: HEALTH INFORMATION MANAGEMENT | Facility: CLINIC | Age: 82
End: 2017-10-17

## 2018-02-16 ENCOUNTER — TELEPHONE (OUTPATIENT)
Dept: FAMILY MEDICINE | Facility: CLINIC | Age: 83
End: 2018-02-16

## 2018-02-16 NOTE — TELEPHONE ENCOUNTER
[facility] phone call message- general phone call:    Reason for call: Caller is calling to verify if patient's needs in the home met the criteria for home care services.   Congential or acquired diseases.     Return call needed: Yes    OK to leave a message on voice mail? Yes    Primary language: Cris      needed? Yes    Call taken on February 16, 2018 at 1:11 PM by Ashley Beauchamp

## 2018-02-19 NOTE — TELEPHONE ENCOUNTER
Faxed current history to fax number provided per general consent form. Advised to request from medical records if more official document is needed. Chapo TAYLOR.

## 2018-02-19 NOTE — TELEPHONE ENCOUNTER
States she called last week regarding some of Patient's diagnosis that she needs, the only thing she has on file is hypertension but they are reporting others so she needs the diagnosis or if any other diagnosis to complete PCA assessments. Please call and advise.

## 2018-02-22 ENCOUNTER — MEDICAL CORRESPONDENCE (OUTPATIENT)
Dept: HEALTH INFORMATION MANAGEMENT | Facility: CLINIC | Age: 83
End: 2018-02-22

## 2018-05-15 DIAGNOSIS — I10 BENIGN ESSENTIAL HYPERTENSION: ICD-10-CM

## 2018-05-16 RX ORDER — AMLODIPINE BESYLATE 2.5 MG/1
2.5 TABLET ORAL DAILY
Qty: 60 TABLET | Refills: 0 | Status: SHIPPED | OUTPATIENT
Start: 2018-05-16 | End: 2018-07-30 | Stop reason: SINTOL

## 2018-05-16 RX ORDER — LOSARTAN POTASSIUM 100 MG/1
100 TABLET ORAL DAILY
Qty: 60 TABLET | Refills: 0 | Status: SHIPPED | OUTPATIENT
Start: 2018-05-16 | End: 2018-07-30

## 2018-07-23 ENCOUNTER — OFFICE VISIT (OUTPATIENT)
Dept: FAMILY MEDICINE | Facility: CLINIC | Age: 83
End: 2018-07-23
Payer: COMMERCIAL

## 2018-07-23 VITALS
DIASTOLIC BLOOD PRESSURE: 71 MMHG | HEART RATE: 61 BPM | SYSTOLIC BLOOD PRESSURE: 162 MMHG | BODY MASS INDEX: 20.66 KG/M2 | RESPIRATION RATE: 16 BRPM | WEIGHT: 98 LBS | TEMPERATURE: 98.7 F | OXYGEN SATURATION: 95 %

## 2018-07-23 DIAGNOSIS — K59.00 CONSTIPATION, UNSPECIFIED CONSTIPATION TYPE: Primary | ICD-10-CM

## 2018-07-23 RX ORDER — AMOXICILLIN 250 MG
1 CAPSULE ORAL 2 TIMES DAILY
Qty: 20 TABLET | Refills: 0 | Status: SHIPPED | OUTPATIENT
Start: 2018-07-23 | End: 2018-08-02

## 2018-07-23 NOTE — PROGRESS NOTES
KATIE Toure is 83 year old yo male/female presenting for:    1. Dizziness    2.****      OBJECTIVE    Vitals  Wt 98 lb (44.5 kg)  BMI 20.66 kg/m2      Physical Exam  General: No acute distress  Ears: canals patent, TM within normal limits  Eyes: EOMI, PERRLA  Nose: nasal mucosa moist, no rhinorrhea  Oral cavity: moist mucosa, no tonsillar exudates, no oropharyngeal erythema/swelling  Neck: good ROM, supple, no apparent tracheal deviation  Respiratory: CTA bilaterally, no wheezes/rhonchi/rhales appreciated, no respiratory distress  Chest wall: No chest wall tenderness  Back: no paraspinal tenderness, no spinal tenderness, no vertebral step offs appreciated  Abdomen: soft, non-distended, non-tender, normoactive bowel sounds  Extremities: no cyanosis, no edema, capillary refill <2 seconds, well perfused  Neuro: no focal deficits noted, reflexes within normal limites  Psych: appropriate affect    Labs  Office Visit on 10/05/2017   Component Date Value Ref Range Status     Glucose 10/05/2017 100.0  60.0 - 109.0 mg/dL Final     Urea Nitrogen 10/05/2017 16.0  7.0 - 30.0 mg/dL Final     Creatinine 10/05/2017 1.2  0.6 - 1.3 mg/dL Final     Sodium 10/05/2017 137.0  133.0 - 144.0 mmol/L Final     Potassium 10/05/2017 3.7  3.4 - 5.3 mmol/L Final     Chloride 10/05/2017 98.0  94.0 - 109.0 mmol/L Final     Carbon Dioxide 10/05/2017 29.0  20.0 - 32.0 mmol/L Final     Calcium 10/05/2017 9.6  8.5 - 10.4 mg/dL Final     eGFR Calculated (Non Black Referen* 10/05/2017 45.7* >60.0 mL/min Final     eGFR Calculated (Black Reference) 10/05/2017 55.3* >60.0 mL/min Final         Imaging    ASSESSMENT/PLAN  # ***        # ***        Precepted with  ***

## 2018-07-23 NOTE — MR AVS SNAPSHOT
After Visit Summary   7/23/2018    Ranjeet Toure    MRN: [id number]           Patient Information     Date Of Birth          1935        Visit Information        Provider Department      7/23/2018 1:20 PM Palla, Misbah Yousuf, MD [facility]        Today's Diagnoses     Constipation, unspecified constipation type    -  1       Follow-ups after your visit        Your next 10 appointments already scheduled     Aug 20, 2018  9:00 AM CDT   Return Visit with Amie Lockwood MD   [facility] ([facility])    [address] [place] [id number]   [phone]              Who to contact     Please call your clinic at [phone] to:    Ask questions about your health    Make or cancel appointments    Discuss your medicines    Learn about your test results    Speak to your doctor            Additional Information About Your Visit        Care EveryWhere ID     This is your Care EveryWhere ID. This could be used by other organizations to access your [facility] medical records  [id number]        Your Vitals Were     Pulse Temperature Respirations Pulse Oximetry BMI (Body Mass Index)       61 98.7  F (37.1  C) (Oral) 16 95% 20.66 kg/m2        Blood Pressure from Last 3 Encounters:   07/30/18 145/80   07/23/18 162/71   10/05/17 149/75    Weight from Last 3 Encounters:   07/30/18 99 lb 9.6 oz (45.2 kg)   07/23/18 98 lb (44.5 kg)   10/05/17 107 lb 12.8 oz (48.9 kg)              We Performed the Following      : Sign Language or Oral - 38-52 minutes          Today's Medication Changes          These changes are accurate as of 7/23/18 11:59 PM.  If you have any questions, ask your nurse or doctor.               Start taking these medicines.        Dose/Directions    glycerin (adult) 2 g Supp Suppository   Commonly known as:  [facility] GLYCERIN ADULT   Used for:  Constipation, unspecified constipation type   Started by:  Palla, Misbah Yousuf, MD        Dose:  1 suppository    Place 1 suppository rectally daily as needed for constipation   Quantity:  1 suppository   Refills:  0       senna-docusate 8.6-50 MG per tablet   Commonly known as:  SENOKOT-S;PERICOLACE   Used for:  Constipation, unspecified constipation type   Started by:  Palla, Misbah Yousuf, MD        Dose:  1 tablet   Take 1 tablet by mouth 2 times daily for 10 days   Quantity:  20 tablet   Refills:  0            Where to get your medicines      These medications were sent to [id number] - [place], [place] - [address]  [address], [place] [address]     Phone:  [phone]     glycerin (adult) 2 g Supp Suppository    senna-docusate 8.6-50 MG per tablet                Primary Care Provider Office Phone # Fax #    Amie Lockwood [phone]       [facility] [address] [place] [address]        Equal Access to Services     [facility] AH: Hadii aad ku hadasho Soomaali, waaxda luqadaha, qaybta kaalmada adeegyada, waxay idiin hayaan adeeg kharanate laemiliana . So [facility] [phone].    ATENCIÓN: Si habla español, tiene a mooney disposición servicios gratuitos de asistencia lingüística. [facility] [phone].    We comply with applicable federal civil rights laws and [place] laws. We do not discriminate on the basis of race, color, national origin, age, disability, sex, sexual orientation, or gender identity.            Thank you!     Thank you for choosing [facility]  for your care. Our goal is always to provide you with excellent care. Hearing back from our patients is one way we can continue to improve our services. Please take a few minutes to complete the written survey that you may receive in the mail after your visit with us. Thank you!             Your Updated Medication List - Protect others around you: Learn how to safely use, store and throw away your medicines at www.disposemymeds.org.          This list is  accurate as of 7/23/18 11:59 PM.  Always use your most recent med list.                   Brand Name Dispense Instructions for use Diagnosis    acetaminophen 500 MG tablet    TYLENOL    100 tablet    Take 2 tablets (1,000 mg) by mouth 2 times daily as needed for mild pain    Chronic midline low back pain, with sciatica presence unspecified       albuterol 108 (90 Base) MCG/ACT Inhaler    PROAIR HFA/PROVENTIL HFA/VENTOLIN HFA     Inhale 2 puffs into the lungs every 4 hours as needed        amLODIPine 2.5 MG tablet    NORVASC    60 tablet    Take 1 tablet (2.5 mg) by mouth daily    Benign essential hypertension       glycerin (adult) 2 g Supp Suppository    CVS GLYCERIN ADULT    1 suppository    Place 1 suppository rectally daily as needed for constipation    Constipation, unspecified constipation type       hypromellose-dextran 0.3-0.1% 0.1-0.3 % Soln ophthalmic solution   Generic drug:  hypromellose-dextran      Apply 1-2 drops to eye 4 times daily as needed        ranitidine 300 MG tablet    ZANTAC    30 tablet    Take 1 tablet (300 mg) by mouth At Bedtime    Gastroesophageal reflux disease, esophagitis presence not specified       senna-docusate 8.6-50 MG per tablet    SENOKOT-S;PERICOLACE    20 tablet    Take 1 tablet by mouth 2 times daily for 10 days    Constipation, unspecified constipation type       Tablet Cutter Misc     1 each    1 Units as needed    Essential hypertension

## 2018-07-23 NOTE — PROGRESS NOTES
"KATIE Toure is 83 year old yo brought in by son with complaints of dizziness. Patient and son are non-english speaking and poor historians. While patient's primary complaint was dizziness, patient was unable to provide specific details upon detailed and pointed questions. When asked whether she feels a room spinning sensation or a light headed sensation, her translated response was \"maybe\". When asked to endorse exacerbating and alleviating factors with specific scenarios asked, patient endorsed feeling \"bad and dizzy all day\". No shortness of breath, no chest pain, no heart palpitations, no motor weakness. Her symptoms started ~48 hours ago. No fevers/chills. No hematuria/dysuria. No new foods/environmental exposures. The only other symptom patient noted was constipation - she has not had a bowel movement in 1 week. She stated her feeling bad overlaps with when she feels stomach discomfort. No nausea/vomiting. Is able to hold down PO intake. No recent illnesses. No recent changes in medication.     OBJECTIVE    Vitals  /71  Pulse 61  Temp 98.7  F (37.1  C) (Oral)  Resp 16  Wt 98 lb (44.5 kg)  SpO2 95%  BMI 20.66 kg/m2      Physical Exam  General: No acute distress  Ears: canals patent, TM within normal limits  Eyes: EOMI, PERRLA, normal sclera, normal conjunctiva  Nose: nasal mucosa moist, no rhinorrhea  Oral cavity: moist mucosa, no tonsillar exudates, no oropharyngeal erythema/swelling  Neck: good ROM, supple, no apparent tracheal deviation  CV: RRR  Respiratory: CTA bilaterally, no wheezes/rhonchi/rhales appreciated, no respiratory distress  Chest wall: No chest wall tenderness  Back: no paraspinal tenderness, no spinal tenderness, no vertebral step offs appreciated, no CVA tenderness  Abdomen: soft, non-distended, left sided discomfort to palpation, faint bowel sounds  Extremities: no cyanosis, no edema, capillary refill <2 seconds, well perfused  Neuro: no focal deficits noted, reflexes within " "normal limits      ASSESSMENT/PLAN    82 yo female with complaints of 48 hours of \"dizziness\", patient is poor historian and her complaint is quite vague. No symptoms concerning for ACS, CVA, or infectious etiology. When asked if patient endorsed light headedness vs dizziness, patient's translated response was \"maybe\". Remaining ROS was negative with exception that patient has not had bowel movement for 1 week. Her physical exam was wnl with exception of mild left sided discomfort to palpation. Patient was able to ambulate and get herself on to bed with assistance of son which is patient's baseline. Her symptoms of \"dizziness\" started shortly after her constipation. I discussed at length with family regarding aggressive testing vs managing just her constipation and seeing how she does. Explained aggressive testing would entail an EKG, CMP, CBC w/diff and if EKG shows abnormalities she may need further work up/intervention. Family stated they would like to treat constipation and see how patient does. Will treat with 1 glycerin suppository for 1 day and sennakot BID x7 days. If patient is not feeling better, will pursue more aggressive work up.       Precepted with Dr. Nuno  "

## 2018-07-23 NOTE — NURSING NOTE
Due to patient being non-English speaking/uses sign language, an  was used for this visit. Only for face-to-face interpretation by an external agency, date and length of interpretation can be found on the scanned worksheet.     name: Julia Alcantara  Agency: [employer]  Language: Cris   Telephone number: [phone]  Type of interpretation: Face-to-face, spoken

## 2018-07-30 ENCOUNTER — OFFICE VISIT (OUTPATIENT)
Dept: FAMILY MEDICINE | Facility: CLINIC | Age: 83
End: 2018-07-30
Payer: COMMERCIAL

## 2018-07-30 VITALS
BODY MASS INDEX: 21 KG/M2 | WEIGHT: 99.6 LBS | DIASTOLIC BLOOD PRESSURE: 80 MMHG | HEART RATE: 73 BPM | SYSTOLIC BLOOD PRESSURE: 145 MMHG | TEMPERATURE: 99 F | OXYGEN SATURATION: 97 %

## 2018-07-30 DIAGNOSIS — R42 DIZZINESS: ICD-10-CM

## 2018-07-30 DIAGNOSIS — I10 BENIGN ESSENTIAL HYPERTENSION: ICD-10-CM

## 2018-07-30 DIAGNOSIS — K59.01 SLOW TRANSIT CONSTIPATION: Primary | ICD-10-CM

## 2018-07-30 RX ORDER — LOSARTAN POTASSIUM 100 MG/1
100 TABLET ORAL DAILY
Qty: 60 TABLET | Refills: 0 | Status: SHIPPED | OUTPATIENT
Start: 2018-07-30 | End: 2018-08-20

## 2018-07-30 NOTE — NURSING NOTE
Due to patient being non-English speaking/uses sign language, an  was used for this visit. Only for face-to-face interpretation by an external agency, date and length of interpretation can be found on the scanned worksheet.     name: Argelia Rey  Agency: [employer]  Language: Cris   Telephone number: [phone]  Type of interpretation: Face-to-face, spoken

## 2018-07-30 NOTE — MR AVS SNAPSHOT
After Visit Summary   7/30/2018    Ranjeet Toure    MRN: [id number]           Patient Information     Date Of Birth          1935        Visit Information        Provider Department      7/30/2018 4:20 PM Palla, Misbah Yousuf, MD [facility]        Today's Diagnoses     Slow transit constipation    -  1    Vague complaint of dizziness        Benign essential hypertension           Follow-ups after your visit        Your next 10 appointments already scheduled     Aug 20, 2018  9:00 AM CDT   Return Visit with Amie Lockwood MD   [facility] ([facility])    [address] [place] [address]   [phone]              Who to contact     Please call your clinic at [phone] to:    Ask questions about your health    Make or cancel appointments    Discuss your medicines    Learn about your test results    Speak to your doctor            Additional Information About Your Visit        Care EveryWhere ID     This is your Care EveryWhere ID. This could be used by other organizations to access your [place] medical records  [id number]        Your Vitals Were     Pulse Temperature Pulse Oximetry BMI (Body Mass Index)          73 99  F (37.2  C) (Oral) 97% 21 kg/m2         Blood Pressure from Last 3 Encounters:   07/30/18 145/80   07/23/18 162/71   10/05/17 149/75    Weight from Last 3 Encounters:   07/30/18 99 lb 9.6 oz (45.2 kg)   07/23/18 98 lb (44.5 kg)   10/05/17 107 lb 12.8 oz (48.9 kg)              We Performed the Following      : Sign Language or Oral - 23-37 minutes          Today's Medication Changes          These changes are accurate as of 7/30/18 11:59 PM.  If you have any questions, ask your nurse or doctor.               Stop taking these medicines if you haven't already. Please contact your care team if you have questions.     amLODIPine 2.5 MG tablet   Commonly known as:  NORVASC   Stopped by:  Palla, Misbah Yousuf, MD                Where to  get your medicines      These medications were sent to [id number] - [place], [place] - [address] AT [address]  [address], [place] [address]     Phone:  [phone]     losartan 100 MG tablet                Primary Care Provider Office Phone # Fax #    Amie Lockwood [phone]       [facility] [address]  [place] [address]        Equal Access to Services     HELEN DE LA FUENTE : Hadii aad ku hadasho Soomaali, waaxda luqadaha, qaybta kaalmada adeegyada, waxay idiin hayaan adeeg kharash laemiliana . So [facility] [phone].    ATENCIÓN: Si habla español, tiene a mooney disposición servicios gratuitos de asistencia lingüística. [facility] [phone].    We comply with applicable federal civil rights laws and [place] laws. We do not discriminate on the basis of race, color, national origin, age, disability, sex, sexual orientation, or gender identity.            Thank you!     Thank you for choosing [facility]  for your care. Our goal is always to provide you with excellent care. Hearing back from our patients is one way we can continue to improve our services. Please take a few minutes to complete the written survey that you may receive in the mail after your visit with us. Thank you!             Your Updated Medication List - Protect others around you: Learn how to safely use, store and throw away your medicines at www.disposemymeds.org.          This list is accurate as of 7/30/18 11:59 PM.  Always use your most recent med list.                   Brand Name Dispense Instructions for use Diagnosis    acetaminophen 500 MG tablet    TYLENOL    100 tablet    Take 2 tablets (1,000 mg) by mouth 2 times daily as needed for mild pain    Chronic midline low back pain, with sciatica presence unspecified       albuterol 108 (90 Base) MCG/ACT Inhaler    PROAIR HFA/PROVENTIL HFA/VENTOLIN HFA     Inhale 2 puffs into the lungs every 4  hours as needed        glycerin (adult) 2 g Supp Suppository    [facility] GLYCERIN ADULT    1 suppository    Place 1 suppository rectally daily as needed for constipation    Constipation, unspecified constipation type       hypromellose-dextran 0.3-0.1% 0.1-0.3 % Soln ophthalmic solution   Generic drug:  hypromellose-dextran      Apply 1-2 drops to eye 4 times daily as needed        losartan 100 MG tablet    COZAAR    60 tablet    Take 1 tablet (100 mg) by mouth daily    Benign essential hypertension       ranitidine 300 MG tablet    ZANTAC    30 tablet    Take 1 tablet (300 mg) by mouth At Bedtime    Gastroesophageal reflux disease, esophagitis presence not specified       senna-docusate 8.6-50 MG per tablet    SENOKOT-S;PERICOLACE    20 tablet    Take 1 tablet by mouth 2 times daily for 10 days    Constipation, unspecified constipation type       Tablet Cutter Misc     1 each    1 Units as needed    Essential hypertension

## 2018-07-30 NOTE — PROGRESS NOTES
Preceptor Attestation:   Patient seen, evaluated and discussed with the resident. I have verified the content of the note, which accurately reflects my assessment of the patient and the plan of care.  Supervising Physician:Cody Servin MD  [facility]

## 2018-07-30 NOTE — PROGRESS NOTES
KATIE Toure is 83 year old yo female presenting for:    1. Constipation and dizziness follow up  - last seen on 7/23/2018 - refer to note for detail  - today:   - having daily BM   - dizziness has resolved   - no complaints nor concerns     OBJECTIVE    Vitals  /80  Pulse 73  Temp 99  F (37.2  C) (Oral)  Wt 99 lb 9.6 oz (45.2 kg)  SpO2 97%  BMI 21 kg/m2    Physical Exam  General: No acute distress  CV: RRR  Respiratory: CTA bilaterally, no wheezes/rhonchi/rhales appreciated, no respiratory distress  Back: no paraspinal tenderness, no spinal tenderness, no vertebral step offs appreciated  Abdomen: soft, non-distended, non-tender, normoactive bowel sounds  Extremities: no cyanosis, no edema, capillary refill <2 seconds, well perfused    ASSESSMENT/PLAN    84 yo female who had complaints of abdominal discomfort, constipation, and what was vaguely described as dizziness for 2 days prior to her visit on 7/23. Decision was made to treat patient's constipation and see how she does on follow up. Patient has followed her bowel regimen and constipation and dizziness both improved. On further discussion, it was noted that patient had also not refilled her losartan and amlodipine since her last visit so she has not taken her BP meds since then. I will continue patient's bowel regimen, re-start losasrtan, and hold the amlodipine. I want patient to follow up with me in 1 week to make a determination on whether or not she needs to be back on the amlodipine.       Precepted with Dr. Servin

## 2018-08-02 NOTE — PROGRESS NOTES
Preceptor Attestation:   Patient seen, evaluated and discussed with the resident. I have verified the content of the note, which accurately reflects my assessment of the patient and the plan of care.    Supervising Physician:Edilberto Nuno MD    [facility]

## 2018-08-20 ENCOUNTER — OFFICE VISIT (OUTPATIENT)
Dept: FAMILY MEDICINE | Facility: CLINIC | Age: 83
End: 2018-08-20
Payer: COMMERCIAL

## 2018-08-20 VITALS
RESPIRATION RATE: 20 BRPM | BODY MASS INDEX: 21.27 KG/M2 | HEART RATE: 62 BPM | SYSTOLIC BLOOD PRESSURE: 203 MMHG | DIASTOLIC BLOOD PRESSURE: 70 MMHG | OXYGEN SATURATION: 96 % | TEMPERATURE: 98.7 F | WEIGHT: 98.6 LBS | HEIGHT: 57 IN

## 2018-08-20 DIAGNOSIS — I10 BENIGN ESSENTIAL HYPERTENSION: Primary | ICD-10-CM

## 2018-08-20 LAB
BUN SERPL-MCNC: 13 MG/DL (ref 7–30)
CALCIUM SERPL-MCNC: 9 MG/DL (ref 8.5–10.4)
CHLORIDE SERPLBLD-SCNC: 108 MMOL/L (ref 94–109)
CO2 SERPL-SCNC: 26 MMOL/L (ref 20–32)
CREAT SERPL-MCNC: 0.9 MG/DL (ref 0.6–1.3)
EGFR CALCULATED (BLACK REFERENCE): 76.9 ML/MIN
EGFR CALCULATED (NON BLACK REFERENCE): 63.6 ML/MIN
GLUCOSE SERPL-MCNC: 123 MG/DL (ref 60–109)
POTASSIUM SERPL-SCNC: 2.8 MMOL/L (ref 3.4–5.3)
SODIUM SERPL-SCNC: 139 MMOL/L (ref 133–144)

## 2018-08-20 RX ORDER — LOSARTAN POTASSIUM 100 MG/1
100 TABLET ORAL DAILY
Qty: 90 TABLET | Refills: 1 | Status: SHIPPED | OUTPATIENT
Start: 2018-08-20 | End: 2018-09-18

## 2018-08-20 NOTE — PROGRESS NOTES
"       KATIE Toure is a 83 year old female who presents for:  Chief Complaint   Patient presents with     RECHECK     BP, elevated today. Not taking her med this AM yet     Medication Reconciliation     No complete       HTN  - was last seen by me in October 2017 for HTN  - at that time she was to continue on losartan 100mg daily and amlodipine 2.5mg daily. Bmp un-changed at that time as well.  - during a July 30th appointment, BP was at goal. She had not refilled her anti-hypertensive medications at that visit, so only losartan was re-started. So was also recently seen for dizziness so there was a concern with amlodipine as well.  - she has taken her losartan now for 3 weeks  - she has not taken it today yet, but has taken it prior to this  - during today's visit she has no headache, change in vision, chest pain, dyspnea, or lower extremity edema    A [employer]  was used for this visit.     ROS: Complete 6 point ROS completed and negative other than stated above.    Social: daughter-in-law present today         Physical Exam:     Vitals:    08/20/18 0907 08/20/18 0910   BP: (!) 210/67 (!) 203/70   Pulse: 62    Resp: 20    Temp: 98.7  F (37.1  C)    SpO2: 96%    Weight: 98 lb 9.6 oz (44.7 kg)    Height: 4' 9\" (144.8 cm)      Body mass index is 21.34 kg/(m^2).  Vital signs normal except elevated BP    General: Alert and orientated in NAD. Pleasant and cooperative.   Cards: RRR, no murmurs, rubs or gallops. No lower extremity edema  Resp: clear vesicular breath sounds bilaterally. Dry bibasilar crackles. No increased work of breathing  Psych: mood good, affect congruent  Neuro: No focal neurological deficits. Symmetric and 5/5 strength in upper and lower extremities.        Results:   Routine BMP pending    Assessment and Plan     1. Benign essential hypertension  BP elevated today after not taking her medication. She is asymptomatic and no abnormal physical examination findings today. Hypertensive " emergency signs/symptoms reviewed along with need to go to ER if they develop. They were in understanding and agreement. Encouraged her to take losartan daily, record BP daily, and follow up with me in clinic on Thursday this week - bringing in her cuff and readings.  - Basic Metabolic Panel ([facility]) - Results < 1 hr  - losartan (COZAAR) 100 MG tablet; Take 1 tablet (100 mg) by mouth daily  Dispense: 90 tablet; Refill: 1  - order for DME; Equipment being ordered: Blood pressure cuff and monitor - automatic.  Dispense: 1 Units; Refill: 0    Clinic visit this Thursday for BP and symptom check.    Options for treatment and follow-up care were reviewed with the patient. Ranjeet Toure  engaged in the decision making process and verbalized understanding of the options discussed and agreed with the final plan.    Precepted with: MD Amie Carreon MD (PGY3)  Pager: [phone]  [facility] Resident    Update returned with K of 2.8. No obvious medications on list, ARB should elevate K level, no lower it. Will repeat bmp on Thursday.

## 2018-08-20 NOTE — NURSING NOTE
Due to patient being non-English speaking/uses sign language, an  was used for this visit. Only for face-to-face interpretation by an external agency, date and length of interpretation can be found on the scanned worksheet.     name: Candido Duvall  Agency: [employer]  Language: Cris   Telephone number: [phone]  Type of interpretation: Face-to-face, spoken

## 2018-08-20 NOTE — MR AVS SNAPSHOT
"              After Visit Summary   8/20/2018    Ranjeet Toure    MRN: [id number]           Patient Information     Date Of Birth          1935        Visit Information        Provider Department      8/20/2018 9:00 AM Amie Lockwood MD [facility]        Today's Diagnoses     Benign essential hypertension    -  1       Follow-ups after your visit        Your next 10 appointments already scheduled     Aug 23, 2018  3:00 PM CDT   Return Visit with Amie Lockwood MD   [facility] ([facility])    [address] [place] [address]   [phone]              Who to contact     Please call your clinic at [phone] to:    Ask questions about your health    Make or cancel appointments    Discuss your medicines    Learn about your test results    Speak to your doctor            Additional Information About Your Visit        Care EveryWhere ID     This is your Care EveryWhere ID. This could be used by other organizations to access your [place] medical records  [id number]        Your Vitals Were     Pulse Temperature Respirations Height Pulse Oximetry BMI (Body Mass Index)    62 98.7  F (37.1  C) 20 4' 9\" (144.8 cm) 96% 21.34 kg/m2       Blood Pressure from Last 3 Encounters:   08/20/18 (!) 203/70   07/30/18 145/80   07/23/18 162/71    Weight from Last 3 Encounters:   08/20/18 98 lb 9.6 oz (44.7 kg)   07/30/18 99 lb 9.6 oz (45.2 kg)   07/23/18 98 lb (44.5 kg)              We Performed the Following     Basic Metabolic Panel ([facility]) - Results < 1 hr          Today's Medication Changes          These changes are accurate as of 8/20/18  9:46 AM.  If you have any questions, ask your nurse or doctor.               Start taking these medicines.        Dose/Directions    order for DME   Used for:  Benign essential hypertension   Started by:  Amie Lockwood MD        Equipment being ordered: Blood pressure cuff and monitor - automatic.   Quantity:  1 Units   Refills:  0 "            Where to get your medicines      These medications were sent to [id number] - [place], [place] - [address] AT [address]  [address], [place] [address]     Phone:  [phone]     losartan 100 MG tablet         Some of these will need a paper prescription and others can be bought over the counter.  Ask your nurse if you have questions.     Bring a paper prescription for each of these medications     order for DME                Primary Care Provider Office Phone # Fax #    Amie Lockwood [phone]       [facility] [address] [place] [address]        Equal Access to Services     VERA DE LA FUENTE : Hadii ines kelleyo Somayte, waaxda luqadaha, qaybta kaalmada adeegyada, johnathan colby. So [facility] [phone].    ATENCIÓN: Si habla español, tiene a mooney disposición servicios gratuitos de asistencia lingüística. Llame al [phone].    We comply with applicable federal civil rights laws and [place] laws. We do not discriminate on the basis of race, color, national origin, age, disability, sex, sexual orientation, or gender identity.            Thank you!     Thank you for choosing [facility]  for your care. Our goal is always to provide you with excellent care. Hearing back from our patients is one way we can continue to improve our services. Please take a few minutes to complete the written survey that you may receive in the mail after your visit with us. Thank you!             Your Updated Medication List - Protect others around you: Learn how to safely use, store and throw away your medicines at www.disposemymeds.org.          This list is accurate as of 8/20/18  9:46 AM.  Always use your most recent med list.                   Brand Name Dispense Instructions for use Diagnosis    acetaminophen 500 MG tablet    TYLENOL    100 tablet    Take 2 tablets (1,000 mg) by mouth 2  times daily as needed for mild pain    Chronic midline low back pain, with sciatica presence unspecified       albuterol 108 (90 Base) MCG/ACT inhaler    PROAIR HFA/PROVENTIL HFA/VENTOLIN HFA     Inhale 2 puffs into the lungs every 4 hours as needed        glycerin (adult) 2 g Supp Suppository    [employer] GLYCERIN ADULT    1 suppository    Place 1 suppository rectally daily as needed for constipation    Constipation, unspecified constipation type       hypromellose-dextran 0.3-0.1% 0.1-0.3 % Soln ophthalmic solution   Generic drug:  hypromellose-dextran      Apply 1-2 drops to eye 4 times daily as needed        losartan 100 MG tablet    COZAAR    90 tablet    Take 1 tablet (100 mg) by mouth daily    Benign essential hypertension       order for DME     1 Units    Equipment being ordered: Blood pressure cuff and monitor - automatic.    Benign essential hypertension       ranitidine 300 MG tablet    ZANTAC    30 tablet    Take 1 tablet (300 mg) by mouth At Bedtime    Gastroesophageal reflux disease, esophagitis presence not specified

## 2018-08-20 NOTE — PROGRESS NOTES
Preceptor Attestation:   Patient seen, evaluated and discussed with the resident.. I have verified the content of the note, which accurately reflects my assessment of the patient and the plan of care.  Supervising Physician:Cody Servin MD  [facility]

## 2018-08-23 ENCOUNTER — OFFICE VISIT (OUTPATIENT)
Dept: FAMILY MEDICINE | Facility: CLINIC | Age: 83
End: 2018-08-23
Payer: COMMERCIAL

## 2018-08-23 VITALS
SYSTOLIC BLOOD PRESSURE: 219 MMHG | TEMPERATURE: 97.9 F | RESPIRATION RATE: 19 BRPM | OXYGEN SATURATION: 97 % | DIASTOLIC BLOOD PRESSURE: 75 MMHG | WEIGHT: 101 LBS | HEART RATE: 63 BPM | BODY MASS INDEX: 21.79 KG/M2 | HEIGHT: 57 IN

## 2018-08-23 DIAGNOSIS — I10 BENIGN ESSENTIAL HYPERTENSION: Primary | ICD-10-CM

## 2018-08-23 DIAGNOSIS — E87.6 HYPOKALEMIA: ICD-10-CM

## 2018-08-23 LAB
BUN SERPL-MCNC: 14 MG/DL (ref 7–30)
CALCIUM SERPL-MCNC: 9.3 MG/DL (ref 8.5–10.4)
CHLORIDE SERPLBLD-SCNC: 108 MMOL/L (ref 94–109)
CO2 SERPL-SCNC: 30 MMOL/L (ref 20–32)
CREAT SERPL-MCNC: 0.8 MG/DL (ref 0.6–1.3)
EGFR CALCULATED (BLACK REFERENCE): 88.1 ML/MIN
EGFR CALCULATED (NON BLACK REFERENCE): 72.8 ML/MIN
GLUCOSE SERPL-MCNC: 94 MG/DL (ref 60–109)
POTASSIUM SERPL-SCNC: 3.5 MMOL/L (ref 3.4–5.3)
SODIUM SERPL-SCNC: 142 MMOL/L (ref 133–144)

## 2018-08-23 RX ORDER — AMLODIPINE BESYLATE 2.5 MG/1
2.5 TABLET ORAL DAILY
Qty: 30 TABLET | Refills: 1 | Status: SHIPPED | OUTPATIENT
Start: 2018-08-23 | End: 2018-08-27

## 2018-08-23 NOTE — PROGRESS NOTES
Preceptor Attestation:   Patient seen, evaluated and discussed with the resident. I have verified the content of the note, which accurately reflects my assessment of the patient and the plan of care.  Supervising Physician:Mp Carlson MD  [facility]

## 2018-08-23 NOTE — NURSING NOTE
Due to patient being non-English speaking/uses sign language, an  was used for this visit. Only for face-to-face interpretation by an external agency, date and length of interpretation can be found on the scanned worksheet.     name: Tha Anderson  Agency: [employer]  Language: Priyanka   Telephone number: [phone]  Type of interpretation: Face-to-face, spoken

## 2018-08-23 NOTE — PROGRESS NOTES
"       KATIE Toure is a 83 year old female who presents for:  Chief Complaint   Patient presents with     RECHECK     BP       Hypertension  - patient is here for BP follow up. She was seen by me on 8/20 at which time her BP was markedly elevated in the 200's.    History of her hypertension:  - previously well controlled on losartan 100mg daily + amlodipine 2.5mg daily.  - was seen in July for dizziness; workup unremarkable at that time.  - BP was slightly elevated at 145/80 duly 7/30 visit. She had not refilled her losartan or amlodipine at that time. Losartan only was restarted.  - she followed up with me then on 8/20 at which time her BP was markedly elevated with SBP in the 200's. She was asymptomatic and no new neurological findings. She had been taking her losartan for ~3 weeks at that time, but didn't take it that day. I confirmed she was taking losartan as family showed me her pill bottle. Given this was a stranglely elevated BP (previously much improved while not on any medication), I opted for close follow up. She is now seeing me today for this close BP and K follow up (K level 2.8 on 8/20 bmp).    - today she has no chest pain, or dyspnea. No new neurological complaints from her or her son.  - she does have a headache that is new in the last couple days  - she was given a home BP cuff, but has not yet picked this up  - there is no improvement in dizziness after stopping the amlodipine    A [employer]  was used for this visit.     ROS: Complete 6 point ROS completed and negative other than stated above.         Physical Exam:     Vitals:    08/23/18 1501 08/23/18 1509   BP: (!) 224/68 (!) 219/75   Pulse: 63    Resp: 19    Temp: 97.9  F (36.6  C)    SpO2: 97%    Weight: 101 lb (45.8 kg)    Height: 4' 9\" (144.8 cm)      Body mass index is 21.86 kg/(m^2).  Vital signs normal except elevated BP    General: Alert and orientated in NAD. Pleasant and cooperative.   Cards: RRR, no murmurs, rubs or " gallops. No lower extremity edema  Resp: clear vesicular breath sounds bilaterally. Faint dry bibasilar crackles. No increased work of breathing  Neuro: CNII-XII intact. Strength symmetric and 5/5 in upper and lower extremities. Negative pronator drift.  Psych: mood good, affect congruent        Results:     Results for orders placed or performed in visit on 08/23/18   Basic Metabolic Panel ([facility]) - Results < 1 hr   Result Value Ref Range    Glucose 94.0 60.0 - 109.0 mg/dL    Urea Nitrogen 14.0 7.0 - 30.0 mg/dL    Creatinine 0.8 0.6 - 1.3 mg/dL    Sodium 142.0 133.0 - 144.0 mmol/L    Potassium 3.5 3.4 - 5.3 mmol/L    Chloride 108.0 94.0 - 109.0 mmol/L    Carbon Dioxide 30.0 20.0 - 32.0 mmol/L    Calcium 9.3 8.5 - 10.4 mg/dL    eGFR Calculated (Non Black Reference) 72.8 >60.0 mL/min    eGFR Calculated (Black Reference) 88.1 >60.0 mL/min         Assessment and Plan     1. Benign essential hypertension  Markedly elevated today, asymptomatic other than for headache. Unremarkable physical exam, including neurological examination. This newly elevated BP is strange as she has previously had well controlled BP, even not on any medication. Considered hypertension post-stroke, but she has no neurological findings on 8/20 and today. As she has tolerated CCB in the past, will re-start amlodipine today. She has a hx of dizziness, but she has not noticed any difference since stopping amlodipine. If this does not improve BP, strongly consider hydrochlorothiazide (no hx of gout and renal function with eGFR 73). Family given return precautions, including worsening headache, chest pain, dyspnea, new neurological findings, etc. They were in agreement and understanding that they would go to the ER if these developed.  - Basic Metabolic Panel ([facility]) - Results < 1 hr  - amLODIPine (NORVASC) 2.5 MG tablet; Take 1 tablet (2.5 mg) by mouth daily  Dispense: 30 tablet; Refill: 1  -  : Sign Language or Oral - 53-67 minutes    2.  Hypokalemia  Resolved.    Follow up Monday next week for BP check - consider addition of HCTZ    Options for treatment and follow-up care were reviewed with the patient. Ranjeet Toure  engaged in the decision making process and verbalized understanding of the options discussed and agreed with the final plan.    Precepted with: Dr. Aldo Lockwood MD (PGY3)  Pager: [phone]  [facility] Resident

## 2018-08-27 ENCOUNTER — OFFICE VISIT (OUTPATIENT)
Dept: FAMILY MEDICINE | Facility: CLINIC | Age: 83
End: 2018-08-27
Payer: COMMERCIAL

## 2018-08-27 VITALS
TEMPERATURE: 98.5 F | HEIGHT: 57 IN | DIASTOLIC BLOOD PRESSURE: 67 MMHG | BODY MASS INDEX: 21.66 KG/M2 | HEART RATE: 58 BPM | WEIGHT: 100.4 LBS | SYSTOLIC BLOOD PRESSURE: 157 MMHG | OXYGEN SATURATION: 98 %
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
## 2018-08-27 DIAGNOSIS — R07.9 CHEST PAIN, UNSPECIFIED TYPE: Primary | ICD-10-CM

## 2018-08-27 DIAGNOSIS — I10 BENIGN ESSENTIAL HYPERTENSION: ICD-10-CM

## 2018-08-27 DIAGNOSIS — R47.81 SLURRED SPEECH: ICD-10-CM

## 2018-08-27 RX ORDER — AMLODIPINE BESYLATE 5 MG/1
5 TABLET ORAL DAILY
Qty: 30 TABLET | Refills: 0 | Status: SHIPPED | OUTPATIENT
Start: 2018-08-27 | End: 2018-09-04

## 2018-08-27 NOTE — Clinical Note
Can you let me know if we change the code? Due to seriousness of the complaint + review of old records + EKG and counseling on multiple issues I placed level 5.

## 2018-08-27 NOTE — PATIENT INSTRUCTIONS
-       Your medication list is printed, please keep this with you, it is helpful to bring this current list to any other medical appointments, the emergency room or hospital.    If you had lab testing today and your results are reassuring or normal they will be be mailed to you within 7 days.     If the lab tests need quick action we will call you with the results.   The phone number we will call with results is # [phone] (home) . If this is not the best number please call our clinic and change the number.    If you need any refills please call your pharmacy and they will contact us.    If you have any further concerns or wish to schedule another appointment you must call our office during normal business hours  [phone] (8-5:00 M-F)  If you have urgent medical questions that cannot wait  you may also call [phone] at any time of day.  If you have a medical emergency please call 911.    Thank you for coming to [facility].        At your visit today, we discussed your risk for falls and preventive options.    Fall Prevention  Falls often occur due to slipping, tripping or losing your balance. Millions of people fall every year and injure themselves. Here are ways to reduce your risk of falling again.    Think about your fall, was there anything that caused your fall that can be fixed, removed, or replaced?    Make your home safe by keeping walkways clear of objects you may trip over.    Use non-slip pads under rugs. Do not use area rugs or small throw rugs.    Use non-slip mats in bathtubs and showers.    Install handrails and lights on staircases.    Do not walk in poorly lit areas.    Do not stand on chairs or wobbly ladders.    Use caution when reaching overhead or looking upward. This position can cause a loss of balance.    Be sure your shoes fit properly, have non-slip bottoms and are in good condition.     Wear shoes both inside and out. Avoid going barefoot or wearing slippers.    Be  cautious when going up and down stairs, curbs, and when walking on uneven sidewalks.    If your balance is poor, consider using a cane or walker.    If your fall was related to alcohol use, stop or limit alcohol intake.     If your fall was related to use of sleeping medicines, talk to your doctor about this. You may need to reduce your dosage at bedtime if you awaken during the night to go to the bathroom.      To reduce the need for nighttime bathroom trips:  ? Avoid drinking fluids for several hours before going to bed  ? Empty your bladder before going to bed  ? Men can keep a urinal at the bedside    Stay as active as you can. Balance, flexibility, strength, and endurance all come from exercise. They all play a role in preventing falls. Ask your healthcare provider which types of activity are right for you.    Get your vision checked on a regular basis.    If you have pets, know where they are before you stand up or walk so you don't trip over them.    Use night lights.  Date Last Reviewed: 11/5/2015 2000-2017 [employer]. [address] [place] [address], [place] [address]. All rights reserved. This information is not intended as a substitute for professional medical care. Always follow your healthcare professional's instructions.

## 2018-08-27 NOTE — PROGRESS NOTES
HPI:     Ranjeet Toure is a 83 year old  female with PMH of HTN who presents for blood pressure follow up.     Ranjeet Toure is accompanied by her son in law and his son.    Hypertension Follow-up   I reviewed Dr. Lockwood's last note about the patient's blood pressure.   Outpatient blood pressures NA- she can't do this because she couldn't afford cuff.   Chest Pain? : Did have chest pain 2 days ago substernally, not associated with activity- she was at rest. She is not sure how long it lasted. This has happened to her before. She thinks it happens every day. Does not happen after she eats. Happens after she walks. Has not told any doctor about this. Gets sweaty too. Also short of breath. Dull pain.   They were not able to  a blood pressure cuff because insurance didn't cover it so they would've had to pay out of pocket and they weren't sure that was necessary.   No headaches since last visit.   Low Salt Diet: No  Daily NSAID Use? No  Did patient take their HTN pills today/last night as usual? Took it last night    Adherence and Exercise   Medication side effects: None  How often is a medication missed? None that they know of.   Are you able to follow the treatment plan? Yes  Exercise: Not exercising- she has a hard time walking without falls. She uses a cane. Has had multiple falls in the last year.     A [employer]  was used for this visit.         PMHX:     Patient Active Problem List   Diagnosis     Benign essential hypertension     Chronic hepatitis B virus infection (H)     Tear film insufficiency     Hearing loss     Osteoarthrosis involving lower leg     Family History        Negative family history of: Diabetes, Coronary Artery Disease, Hypertension, Hyperlipidemia, Breast Cancer, Cancer - colorectal, Ovarian Cancer, Prostate Cancer, Other Cancer, Mental Illness, Cerebrovascular Disease, Anesthesia Reaction, Asthma, Osteoporosis, Known Genetic Syndrome, Obesity, Unknown/Adopted, Colon Cancer,  "Depression, Anxiety Disorder, Mental Illness, Substance Abuse, Genetic Disorder, Thyroid Disease        No past surgeries.    Current Outpatient Prescriptions   Medication Sig Dispense Refill     amLODIPine (NORVASC) 2.5 MG tablet Take 1 tablet (2.5 mg) by mouth daily 30 tablet 1     losartan (COZAAR) 100 MG tablet Take 1 tablet (100 mg) by mouth daily 90 tablet 1     acetaminophen (TYLENOL) 500 MG tablet Take 2 tablets (1,000 mg) by mouth 2 times daily as needed for mild pain 100 tablet 3     albuterol (PROAIR HFA/PROVENTIL HFA/VENTOLIN HFA) 108 (90 BASE) MCG/ACT Inhaler Inhale 2 puffs into the lungs every 4 hours as needed       glycerin, adult, ([facility] GLYCERIN ADULT) 2 g SUPP Suppository Place 1 suppository rectally daily as needed for constipation 1 suppository 0     hypromellose-dextran 0.3-0.1% (ARTIFICIAL TEARS) opthalmic solution Apply 1-2 drops to eye 4 times daily as needed       order for DME Equipment being ordered: Blood pressure cuff and monitor - automatic. 1 Units 0       Social History   Substance Use Topics     Smoking status: Never Smoker     Smokeless tobacco: Never Used     Alcohol use No     Allergies   Allergen Reactions     No Known Allergies        No results found for this or any previous visit (from the past 24 hour(s)).         Review of Systems:   10 point ROS negative except as noted.           Physical Exam:     Vitals:    08/27/18 1307   BP: 157/67   Pulse: 58   Temp: 98.5  F (36.9  C)   TempSrc: Oral   SpO2: 98%   Weight: 45.5 kg (100 lb 6.4 oz)   Height: 1.438 m (4' 8.61\")     Body mass index is 22.02 kg/(m^2).    General: Alert female who does not talk much, son in law answers several questions for her. She is well appearing. She speaks softly.   HEENT: PERRL, moist oral mucus membranes  Pulm: +tachypnea, +bilateral crackles  CV: Irregular rhythm, no murmur  Abd: soft, NTND, no masses  Ext: Warm, well perfused, 2+ BL radial pulses, no LE edema  Skin: No rash on limited skin " exam  Neuro: No cog wheeling, no rigidity, does speak softly, has a mildly shuffling gate but does use her arms to swing. Her speech is difficult to understand (by  report)-  tells me some words are slurred but she is not having any word finding difficulties.   Psych: Mood appropriate to visit content, full affect, rational thought content and process      Assessment and Plan       1. Chest pain  I am concerned that this is anginal chest pain and it sounds like she is having it every day. EKG today shows no sign of old infarct and no acute ST/T changes. I advised she should get a stress test but she declines at this time. I explained the risk of this decision including MI or death. She and her son in law express understanding. I explained that a positive stress test could be dealt with by either angiography or medical management. They tend to think they would opt for medical management. I advised reasons to call EMS.   - EKG 12-lead complete w/read - Clinics  -  : Sign Language or Oral - 68-82 minutes    2. Benign essential hypertension  BP has improved since last visit. Will increase amlodipine to 10mg daily. RTC 1 week.    3. History of falls  I have some concerns for parkinson's like symptoms in this patient given soft speech, memory trouble, slow gait but there is no tremor, no rigidity and that speaks against this diagnosis. I think at another visit considering a PT referral for recent falls would be very reasonable but given acute chest pain I did not make this referral today.     EKG  Indication:Chest pain    Interpretation: Sinus rhythm with first degree AV block, normal QRS and QTc, no ST/T wave changes.     Patient informed at visit.      There are no discontinued medications.    Options for treatment and follow-up care were reviewed with the patient and/or guardian. Ku Paw and/or guardian engaged in the decision making process and verbalized understanding of the options  discussed and agreed with the final plan.    Evy Prado MD  [facility]   Pager: [phone]

## 2018-08-27 NOTE — NURSING NOTE
Due to patient being non-English speaking/uses sign language, an  was used for this visit. Only for face-to-face interpretation by an external agency, date and length of interpretation can be found on the scanned worksheet.     name: Velia Zayas  Agency: [employer]  Language: Cris   Telephone number: [phone]  Type of interpretation: Face-to-face, spoken

## 2018-08-27 NOTE — MR AVS SNAPSHOT
After Visit Summary   8/27/2018    Ranjeet Toure    MRN: [id number]           Patient Information     Date Of Birth          1935        Visit Information        Provider Department      8/27/2018 1:00 PM Evy Prado MD [facility]        Today's Diagnoses     Chest pain    -  1    Benign essential hypertension          Care Instructions    -       Your medication list is printed, please keep this with you, it is helpful to bring this current list to any other medical appointments, the emergency room or hospital.    If you had lab testing today and your results are reassuring or normal they will be be mailed to you within 7 days.     If the lab tests need quick action we will call you with the results.   The phone number we will call with results is # [phone] (home) . If this is not the best number please call our clinic and change the number.    If you need any refills please call your pharmacy and they will contact us.    If you have any further concerns or wish to schedule another appointment you must call our office during normal business hours  [phone] (8-5:00 M-F)  If you have urgent medical questions that cannot wait  you may also call [phone] at any time of day.  If you have a medical emergency please call [phone].    Thank you for coming to [facility].        At your visit today, we discussed your risk for falls and preventive options.    Fall Prevention  Falls often occur due to slipping, tripping or losing your balance. Millions of people fall every year and injure themselves. Here are ways to reduce your risk of falling again.    Think about your fall, was there anything that caused your fall that can be fixed, removed, or replaced?    Make your home safe by keeping walkways clear of objects you may trip over.    Use non-slip pads under rugs. Do not use area rugs or small throw rugs.    Use non-slip mats in bathtubs and showers.    Install handrails and  lights on staircases.    Do not walk in poorly lit areas.    Do not stand on chairs or wobbly ladders.    Use caution when reaching overhead or looking upward. This position can cause a loss of balance.    Be sure your shoes fit properly, have non-slip bottoms and are in good condition.     Wear shoes both inside and out. Avoid going barefoot or wearing slippers.    Be cautious when going up and down stairs, curbs, and when walking on uneven sidewalks.    If your balance is poor, consider using a cane or walker.    If your fall was related to alcohol use, stop or limit alcohol intake.     If your fall was related to use of sleeping medicines, talk to your doctor about this. You may need to reduce your dosage at bedtime if you awaken during the night to go to the bathroom.      To reduce the need for nighttime bathroom trips:  ? Avoid drinking fluids for several hours before going to bed  ? Empty your bladder before going to bed  ? Men can keep a urinal at the bedside    Stay as active as you can. Balance, flexibility, strength, and endurance all come from exercise. They all play a role in preventing falls. Ask your healthcare provider which types of activity are right for you.    Get your vision checked on a regular basis.    If you have pets, know where they are before you stand up or walk so you don't trip over them.    Use night lights.  Date Last Reviewed: 11/5/2015 2000-2017 [employer]. [address] [place] [address]. All rights reserved. This information is not intended as a substitute for professional medical care. Always follow your healthcare professional's instructions.                Follow-ups after your visit        Your next 10 appointments already scheduled     Sep 04, 2018 10:00 AM CDT   Return Visit with Angie Neves MD   [facility] ([facility])    [address] [place] [address]   [phone]              Who to contact      "Please call your clinic at [phone] to:    Ask questions about your health    Make or cancel appointments    Discuss your medicines    Learn about your test results    Speak to your doctor            Additional Information About Your Visit        Care EveryWhere ID     This is your Care EveryWhere ID. This could be used by other organizations to access your [facility] medical records  [id number]        Your Vitals Were     Pulse Temperature Height Pulse Oximetry BMI (Body Mass Index)       58 98.5  F (36.9  C) (Oral) 4' 8.61\" (143.8 cm) 98% 22.02 kg/m2        Blood Pressure from Last 3 Encounters:   08/27/18 157/67   08/23/18 (!) 219/75   08/20/18 (!) 203/70    Weight from Last 3 Encounters:   08/27/18 100 lb 6.4 oz (45.5 kg)   08/23/18 101 lb (45.8 kg)   08/20/18 98 lb 9.6 oz (44.7 kg)              We Performed the Following     EKG 12-lead complete w/read - Clinics          Today's Medication Changes          These changes are accurate as of 8/27/18  2:18 PM.  If you have any questions, ask your nurse or doctor.               These medicines have changed or have updated prescriptions.        Dose/Directions    amLODIPine 5 MG tablet   Commonly known as:  NORVASC   This may have changed:    - medication strength  - how much to take   Used for:  Benign essential hypertension   Changed by:  Evy Prado MD        Dose:  5 mg   Take 1 tablet (5 mg) by mouth daily   Quantity:  30 tablet   Refills:  0            Where to get your medicines      These medications were sent to [id number] - [place], [place] - [address]  [address], [place] [address]     Phone:  [phone]     amLODIPine 5 MG tablet                Primary Care Provider Office Phone # Fax #    Amie Lockwood [phone]       [facility] [address] [place] [address]        Equal Access to Services     VERA DE LA FUENTE AH: Rose krishnamurthy " Soomaali, waaxda luqadaha, qaybta kaalmada светлана, johnathan colby. So [facility] [phone].    ATENCIÓN: Si kathy alexandra, tiene a mooney disposición servicios gratuitos de asistencia lingüística. Jelly al [phone].    We comply with applicable federal civil rights laws and [place] laws. We do not discriminate on the basis of race, color, national origin, age, disability, sex, sexual orientation, or gender identity.            Thank you!     Thank you for choosing [facility]  for your care. Our goal is always to provide you with excellent care. Hearing back from our patients is one way we can continue to improve our services. Please take a few minutes to complete the written survey that you may receive in the mail after your visit with us. Thank you!             Your Updated Medication List - Protect others around you: Learn how to safely use, store and throw away your medicines at www.disposemymeds.org.          This list is accurate as of 8/27/18  2:18 PM.  Always use your most recent med list.                   Brand Name Dispense Instructions for use Diagnosis    acetaminophen 500 MG tablet    TYLENOL    100 tablet    Take 2 tablets (1,000 mg) by mouth 2 times daily as needed for mild pain    Chronic midline low back pain, with sciatica presence unspecified       albuterol 108 (90 Base) MCG/ACT inhaler    PROAIR HFA/PROVENTIL HFA/VENTOLIN HFA     Inhale 2 puffs into the lungs every 4 hours as needed        amLODIPine 5 MG tablet    NORVASC    30 tablet    Take 1 tablet (5 mg) by mouth daily    Benign essential hypertension       glycerin (adult) 2 g Supp Suppository    CVS GLYCERIN ADULT    1 suppository    Place 1 suppository rectally daily as needed for constipation    Constipation, unspecified constipation type       hypromellose-dextran 0.3-0.1% 0.1-0.3 % Soln ophthalmic solution   Generic drug:  hypromellose-dextran      Apply 1-2 drops to eye 4 times daily as needed         losartan 100 MG tablet    COZAAR    90 tablet    Take 1 tablet (100 mg) by mouth daily    Benign essential hypertension       order for DME     1 Units    Equipment being ordered: Blood pressure cuff and monitor - automatic.    Benign essential hypertension

## 2018-09-04 ENCOUNTER — OFFICE VISIT (OUTPATIENT)
Dept: FAMILY MEDICINE | Facility: CLINIC | Age: 83
End: 2018-09-04
Payer: COMMERCIAL

## 2018-09-04 VITALS
TEMPERATURE: 98.4 F | DIASTOLIC BLOOD PRESSURE: 74 MMHG | WEIGHT: 99.8 LBS | OXYGEN SATURATION: 97 % | HEIGHT: 57 IN | SYSTOLIC BLOOD PRESSURE: 147 MMHG | BODY MASS INDEX: 21.53 KG/M2 | HEART RATE: 65 BPM

## 2018-09-04 DIAGNOSIS — I10 BENIGN ESSENTIAL HYPERTENSION: ICD-10-CM

## 2018-09-04 PROBLEM — R07.9 CHEST PAIN, UNSPECIFIED TYPE: Status: ACTIVE | Noted: 2018-09-04
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
## 2018-09-04 PROBLEM — R47.81 SLURRED SPEECH: Status: ACTIVE | Noted: 2018-09-04

## 2018-09-04 RX ORDER — AMLODIPINE BESYLATE 5 MG/1
5 TABLET ORAL DAILY
Qty: 30 TABLET | Refills: 0 | Status: SHIPPED | OUTPATIENT
Start: 2018-09-04 | End: 2018-09-18 | Stop reason: DRUGHIGH

## 2018-09-04 RX ORDER — AMLODIPINE BESYLATE 5 MG/1
10 TABLET ORAL DAILY
Qty: 180 TABLET | Refills: 1 | Status: SHIPPED | OUTPATIENT
Start: 2018-09-04 | End: 2018-09-18 | Stop reason: DRUGHIGH

## 2018-09-04 NOTE — PATIENT INSTRUCTIONS
Medication Changes:     - Start taking 2 tablets of Amlodipine in the morning     - Return to clinic in 2 weeks for a blood pressure follow-up and blood draw to check your kidney function while taking your medicines      Your medication list is printed, please keep this with you, it is helpful to bring this current list to any other medical appointments, the emergency room or hospital.    If you had lab testing today and your results are reassuring or normal they will be be mailed to you within 7 days.     If the lab tests need quick action we will call you with the results.   The phone number we will call with results is # [phone] (home) . If this is not the best number please call our clinic and change the number.    If you need any refills please call your pharmacy and they will contact us.    If you have any further concerns or wish to schedule another appointment you must call our office during normal business hours  [phone] (8-5:00 M-F)  If you have urgent medical questions that cannot wait  you may also call [phone] at any time of day.  If you have a medical emergency please call [phone].    Thank you for coming to [facility].

## 2018-09-04 NOTE — MR AVS SNAPSHOT
After Visit Summary   9/4/2018    Ranjeet Toure    MRN: [id number]           Patient Information     Date Of Birth          1935        Visit Information        Provider Department      9/4/2018 10:00 AM Angie Neves MD [facility]        Today's Diagnoses     Benign essential hypertension          Care Instructions    Medication Changes:     - Start taking 2 tablets of Amlodipine in the morning     - Return to clinic in 2 weeks for a blood pressure follow-up and blood draw to check your kidney function while taking your medicines      Your medication list is printed, please keep this with you, it is helpful to bring this current list to any other medical appointments, the emergency room or hospital.    If you had lab testing today and your results are reassuring or normal they will be be mailed to you within 7 days.     If the lab tests need quick action we will call you with the results.   The phone number we will call with results is # [phone] (home) . If this is not the best number please call our clinic and change the number.    If you need any refills please call your pharmacy and they will contact us.    If you have any further concerns or wish to schedule another appointment you must call our office during normal business hours  [phone] (8-5:00 M-F)  If you have urgent medical questions that cannot wait  you may also call [phone] at any time of day.  If you have a medical emergency please call [phone].    Thank you for coming to [facility].              Follow-ups after your visit        Your next 10 appointments already scheduled     Sep 18, 2018  9:40 AM CDT   Return Visit with Amie Lockwood MD   [facility] ([facility])    [address] [place] [id number]   [phone]              Who to contact     Please call your clinic at [phone] to:    Ask questions about your health    Make or cancel  "appointments    Discuss your medicines    Learn about your test results    Speak to your doctor            Additional Information About Your Visit        Care EveryWhere ID     This is your Care EveryWhere ID. This could be used by other organizations to access your [facility] medical records  [id number]        Your Vitals Were     Pulse Temperature Height Head Circumference Pulse Oximetry BMI (Body Mass Index)    65 98.4  F (36.9  C) (Oral) 4' 9.09\" (145 cm) 16 cm (6.3\") 97% 21.53 kg/m2       Blood Pressure from Last 3 Encounters:   09/04/18 147/74   08/27/18 157/67   08/23/18 (!) 219/75    Weight from Last 3 Encounters:   09/04/18 99 lb 12.8 oz (45.3 kg)   08/27/18 100 lb 6.4 oz (45.5 kg)   08/23/18 101 lb (45.8 kg)              Today, you had the following     No orders found for display         Today's Medication Changes          These changes are accurate as of 9/4/18 10:39 AM.  If you have any questions, ask your nurse or doctor.               Start taking these medicines.        Dose/Directions    * amLODIPine 5 MG tablet   Commonly known as:  NORVASC   Used for:  Benign essential hypertension   Started by:  Angie Neves MD        Dose:  5 mg   Take 1 tablet (5 mg) by mouth daily   Quantity:  30 tablet   Refills:  0       * amLODIPine 5 MG tablet   Commonly known as:  NORVASC   Used for:  Benign essential hypertension   Started by:  Angie Neves MD        Dose:  10 mg   Take 2 tablets (10 mg) by mouth daily   Quantity:  180 tablet   Refills:  1       * Notice:  This list has 2 medication(s) that are the same as other medications prescribed for you. Read the directions carefully, and ask your doctor or other care provider to review them with you.         Where to get your medicines      These medications were sent to [id number] - [place], [place] - [address]  [address], [place] [address]     Phone:  [phone]     " amLODIPine 5 MG tablet    amLODIPine 5 MG tablet                Primary Care Provider Office Phone # Fax #    Amie Lockwood [phone]       [facility] [address] [place] [address]        Equal Access to Services     VERA SUDHAKAR : Hadii aad ku hadsuado Soomaali, waaxda luqadaha, qaybta kaalmada adeegyada, waxay idiin hayaan adebhavani doransh laJaylondionna colby. So [facility] [phone].    ATENCIÓN: Si habla español, tiene a mooney disposición servicios gratuitos de asistencia lingüística. Llame al [phone].    We comply with applicable federal civil rights laws and [place] laws. We do not discriminate on the basis of race, color, national origin, age, disability, sex, sexual orientation, or gender identity.            Thank you!     Thank you for choosing [facility]  for your care. Our goal is always to provide you with excellent care. Hearing back from our patients is one way we can continue to improve our services. Please take a few minutes to complete the written survey that you may receive in the mail after your visit with us. Thank you!             Your Updated Medication List - Protect others around you: Learn how to safely use, store and throw away your medicines at www.disposemymeds.org.          This list is accurate as of 9/4/18 10:39 AM.  Always use your most recent med list.                   Brand Name Dispense Instructions for use Diagnosis    acetaminophen 500 MG tablet    TYLENOL    100 tablet    Take 2 tablets (1,000 mg) by mouth 2 times daily as needed for mild pain    Chronic midline low back pain, with sciatica presence unspecified       albuterol 108 (90 Base) MCG/ACT inhaler    PROAIR HFA/PROVENTIL HFA/VENTOLIN HFA     Inhale 2 puffs into the lungs every 4 hours as needed        * amLODIPine 5 MG tablet    NORVASC    30 tablet    Take 1 tablet (5 mg) by mouth daily    Benign essential hypertension       * amLODIPine 5 MG tablet    NORVASC    180 tablet     Take 2 tablets (10 mg) by mouth daily    Benign essential hypertension       glycerin (adult) 2 g Supp Suppository    [facility] GLYCERIN ADULT    1 suppository    Place 1 suppository rectally daily as needed for constipation    Constipation, unspecified constipation type       hypromellose-dextran 0.3-0.1% 0.1-0.3 % Soln ophthalmic solution   Generic drug:  hypromellose-dextran      Apply 1-2 drops to eye 4 times daily as needed        losartan 100 MG tablet    COZAAR    90 tablet    Take 1 tablet (100 mg) by mouth daily    Benign essential hypertension       order for DME     1 Units    Equipment being ordered: Blood pressure cuff and monitor - automatic.    Benign essential hypertension       * Notice:  This list has 2 medication(s) that are the same as other medications prescribed for you. Read the directions carefully, and ask your doctor or other care provider to review them with you.

## 2018-09-04 NOTE — NURSING NOTE
Due to patient being non-English speaking/uses sign language, an  was used for this visit. Only for face-to-face interpretation by an external agency, date and length of interpretation can be found on the scanned worksheet.     name: Eamon Rodriguez  Agency: [employer]  Language: Cris   Telephone number: [phone]  Type of interpretation: Face-to-face, spoken

## 2018-09-11 ENCOUNTER — MEDICAL CORRESPONDENCE (OUTPATIENT)
Dept: HEALTH INFORMATION MANAGEMENT | Facility: CLINIC | Age: 83
End: 2018-09-11

## 2018-09-18 ENCOUNTER — OFFICE VISIT (OUTPATIENT)
Dept: FAMILY MEDICINE | Facility: CLINIC | Age: 83
End: 2018-09-18
Payer: COMMERCIAL

## 2018-09-18 VITALS
HEART RATE: 79 BPM | DIASTOLIC BLOOD PRESSURE: 72 MMHG | TEMPERATURE: 98.5 F | OXYGEN SATURATION: 95 % | HEIGHT: 57 IN | RESPIRATION RATE: 20 BRPM | WEIGHT: 100.6 LBS | SYSTOLIC BLOOD PRESSURE: 145 MMHG | BODY MASS INDEX: 21.7 KG/M2

## 2018-09-18 DIAGNOSIS — I10 BENIGN ESSENTIAL HYPERTENSION: Primary | ICD-10-CM
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
## 2018-09-18 PROBLEM — R47.81 SLURRED SPEECH: Status: RESOLVED | Noted: 2018-09-04 | Resolved: 2018-09-18

## 2018-09-18 LAB
BUN SERPL-MCNC: 18 MG/DL (ref 7–30)
CALCIUM SERPL-MCNC: 9.2 MG/DL (ref 8.5–10.4)
CHLORIDE SERPLBLD-SCNC: 104 MMOL/L (ref 94–109)
CO2 SERPL-SCNC: 29 MMOL/L (ref 20–32)
CREAT SERPL-MCNC: 0.7 MG/DL (ref 0.6–1.3)
EGFR CALCULATED (BLACK REFERENCE): >90 ML/MIN
EGFR CALCULATED (NON BLACK REFERENCE): 84.9 ML/MIN
GLUCOSE SERPL-MCNC: 117 MG/DL (ref 60–109)
POTASSIUM SERPL-SCNC: 3 MMOL/L (ref 3.4–5.3)
SODIUM SERPL-SCNC: 145 MMOL/L (ref 133–144)

## 2018-09-18 RX ORDER — LOSARTAN POTASSIUM 100 MG/1
100 TABLET ORAL DAILY
Qty: 90 TABLET | Refills: 1 | Status: SHIPPED | OUTPATIENT
Start: 2018-09-18 | End: 2018-12-19

## 2018-09-18 RX ORDER — AMLODIPINE BESYLATE 10 MG/1
10 TABLET ORAL DAILY
Qty: 90 TABLET | Refills: 3 | Status: SHIPPED | OUTPATIENT
Start: 2018-09-18 | End: 2019-09-19

## 2018-09-18 RX ORDER — ALBUTEROL SULFATE 90 UG/1
2 AEROSOL, METERED RESPIRATORY (INHALATION) EVERY 4 HOURS PRN
Qty: 1 INHALER | Refills: 11
Start: 2018-09-18 | End: 2018-10-18

## 2018-09-18 NOTE — NURSING NOTE
Due to patient being non-English speaking/uses sign language, an  was used for this visit. Only for face-to-face interpretation by an external agency, date and length of interpretation can be found on the scanned worksheet.     name: Julia Alcantara  Agency: [employer]  Language: Cris  Telephone number: [phone]  Type of interpretation: Face-to-face, spoken

## 2018-09-18 NOTE — PATIENT INSTRUCTIONS
Take 1 pill (100mg) of losartan daily.  Take 1 pill (10mg) of amlodipine daily.    Come back in 1 week for a lab only visit.    Come back in 1 month for a doctor visit.

## 2018-09-18 NOTE — PROGRESS NOTES
Preceptor Attestation:   Patient seen, evaluated and discussed with the resident. I have verified the content of the note, which accurately reflects my assessment of the patient and the plan of care.  Supervising Physician:Doris Segovia DO [facility]

## 2018-09-18 NOTE — MR AVS SNAPSHOT
"              After Visit Summary   9/18/2018    Ranjeet Toure    MRN: [id number]           Patient Information     Date Of Birth          1935        Visit Information        Provider Department      9/18/2018 9:40 AM Amie Lockwood MD [facility]        Today's Diagnoses     Benign essential hypertension    -  1      Care Instructions    Take 1 pill (100mg) of losartan daily.  Take 1 pill (10mg) of amlodipine daily.    Come back in 1 week for a lab only visit.    Come back in 1 month for a doctor visit.          Follow-ups after your visit        Who to contact     Please call your clinic at [phone] to:    Ask questions about your health    Make or cancel appointments    Discuss your medicines    Learn about your test results    Speak to your doctor            Additional Information About Your Visit        Care EveryWhere ID     This is your Care EveryWhere ID. This could be used by other organizations to access your [facility] medical records  [id number]        Your Vitals Were     Pulse Temperature Respirations Height Pulse Oximetry BMI (Body Mass Index)    79 98.5  F (36.9  C) 20 4' 9\" (144.8 cm) 95% 21.77 kg/m2       Blood Pressure from Last 3 Encounters:   09/18/18 146/66   09/04/18 147/74   08/27/18 157/67    Weight from Last 3 Encounters:   09/18/18 100 lb 9.6 oz (45.6 kg)   09/04/18 99 lb 12.8 oz (45.3 kg)   08/27/18 100 lb 6.4 oz (45.5 kg)              We Performed the Following     Basic Metabolic Panel ([facility]) - Results < 1 hr          Today's Medication Changes          These changes are accurate as of 9/18/18 10:26 AM.  If you have any questions, ask your nurse or doctor.               These medicines have changed or have updated prescriptions.        Dose/Directions    amLODIPine 10 MG tablet   Commonly known as:  NORVASC   This may have changed:    - medication strength  - how much to take  - Another medication with the same name was removed. Continue taking this medication, and " follow the directions you see here.   Used for:  Benign essential hypertension   Changed by:  Amie Lockwood MD        Dose:  10 mg   Take 1 tablet (10 mg) by mouth daily   Quantity:  90 tablet   Refills:  3            Where to get your medicines      These medications were sent to [id number] - [place], [place] - [address]  [address], [place] [address]     Phone:  [phone]     amLODIPine 10 MG tablet    losartan 100 MG tablet         Some of these will need a paper prescription and others can be bought over the counter.  Ask your nurse if you have questions.     You don't need a prescription for these medications     albuterol 108 (90 Base) MCG/ACT inhaler                Primary Care Provider Office Phone # Fax #    Amie Lockwood [phone]       [facility] [address] [place] [address]        Equal Access to Services     [facility] Center[facility] AH: Hadii aad ku hadasho Soomaali, waaxda luqadaha, qaybta kaalmada adeegyada, waxay idiin hayaan cathy kharash mai . So [facility] [phone].    ATENCIÓN: Si habla español, tiene a mooney disposición servicios gratuitos de asistencia lingüística. Llame al [phone].    We comply with applicable federal civil rights laws and [place] laws. We do not discriminate on the basis of race, color, national origin, age, disability, sex, sexual orientation, or gender identity.            Thank you!     Thank you for choosing [facility]  for your care. Our goal is always to provide you with excellent care. Hearing back from our patients is one way we can continue to improve our services. Please take a few minutes to complete the written survey that you may receive in the mail after your visit with us. Thank you!             Your Updated Medication List - Protect others around you: Learn how to safely use, store and throw away your medicines at  www.disposemymeds.org.          This list is accurate as of 9/18/18 10:26 AM.  Always use your most recent med list.                   Brand Name Dispense Instructions for use Diagnosis    acetaminophen 500 MG tablet    TYLENOL    100 tablet    Take 2 tablets (1,000 mg) by mouth 2 times daily as needed for mild pain    Chronic midline low back pain, with sciatica presence unspecified       albuterol 108 (90 Base) MCG/ACT inhaler    PROAIR HFA/PROVENTIL HFA/VENTOLIN HFA    1 Inhaler    Inhale 2 puffs into the lungs every 4 hours as needed        amLODIPine 10 MG tablet    NORVASC    90 tablet    Take 1 tablet (10 mg) by mouth daily    Benign essential hypertension       glycerin (adult) 2 g Supp Suppository    [facility] GLYCERIN ADULT    1 suppository    Place 1 suppository rectally daily as needed for constipation    Constipation, unspecified constipation type       hypromellose-dextran 0.3-0.1% 0.1-0.3 % Soln ophthalmic solution   Generic drug:  hypromellose-dextran      Apply 1-2 drops to eye 4 times daily as needed        losartan 100 MG tablet    COZAAR    90 tablet    Take 1 tablet (100 mg) by mouth daily    Benign essential hypertension

## 2018-09-18 NOTE — PROGRESS NOTES
"KATIE Toure is a 83 year old female who presents for:  Chief Complaint   Patient presents with     RECHECK     BP and meds RF on meds       Hypertension  - had hypertensive urgency in the last month during an august 20th visit with readings in the 200's/60's.  - was to continue on losartan and has now titrated up on amlodipine to 10mg daily.  - currently taking 100mg losartan daily and 10mg amlodipine daily (prescription bottles brought to clinic visit and reviewed)  - no chest pain, dyspnea, change with chest pain or breathing during exertion. Had previous pain with ambulation and EKG was unremarkable (8/27 office visit)  - no lightheadedness, change in vision, or other symptoms of orthostasis  - they do not have BP cuff d/t insurance not covering it and they cannot afford one.    A [employer]  was used for this visit.     ROS: Complete 6 point ROS completed and negative other than stated above.    Social: son-in-law and grandson present today         Physical Exam:     Vitals:    09/18/18 0946   BP: 146/66   Pulse: 79   Resp: 20   Temp: 98.5  F (36.9  C)   SpO2: 95%   Weight: 100 lb 9.6 oz (45.6 kg)   Height: 4' 9\" (144.8 cm)     Body mass index is 21.77 kg/(m^2).  Vitals were reviewed and were normal    General: Alert and orientated in NAD. Pleasant and cooperative.   Cards: RRR, no murmurs, rubs or gallops. No lower extremity edema  Resp: clear vesicular breath sounds bilaterally, no crackles or wheezes. No increased work of breathing  Psych: mood good, affect congruent        Results:     Results for orders placed or performed in visit on 09/18/18   Basic Metabolic Panel ([facility]) - Results < 1 hr   Result Value Ref Range    Glucose 117.0 (H) 60.0 - 109.0 mg/dL    Urea Nitrogen 18.0 7.0 - 30.0 mg/dL    Creatinine 0.7 0.6 - 1.3 mg/dL    Sodium 145.0 (H) 133.0 - 144.0 mmol/L    Potassium 3.0 (L) 3.4 - 5.3 mmol/L    Chloride 104.0 94.0 - 109.0 mmol/L    Carbon Dioxide 29.0 20.0 - 32.0 mmol/L    " Calcium 9.2 8.5 - 10.4 mg/dL    eGFR Calculated (Non Black Reference) 84.9 >60.0 mL/min    eGFR Calculated (Black Reference) >90 >60.0 mL/min         Assessment and Plan     1. Benign essential hypertension  Previously with hypertensive urgency. Has improved blood pressure control, no chest pain/dyspnea, and is tolerating the medications well. Potassium found to be mildly low at 3.0. Has had low normal-low levels in the past which is interesting as she is on an ARB. Most recently was 2.8 on 8/20 that self-resolved to 3.5 on repeat labs 3 days later w/o any intervention.   - continue on current 100mg daily losartan and 10mg daily amlodipine  - follow up in 1 week for lab only visit (hypokalemia). Future lab order placed.  - follow up in 1 month for BP and BMP follow up    Options for treatment and follow-up care were reviewed with the patient. Ranjeet Toure  engaged in the decision making process and verbalized understanding of the options discussed and agreed with the final plan.    Precepted with: DO Amie Funk MD (PGY3)  Pager: [phone]  [facility] Resident

## 2018-09-25 DIAGNOSIS — E87.6 HYPOKALEMIA: Primary | ICD-10-CM

## 2018-09-25 DIAGNOSIS — I10 BENIGN ESSENTIAL HYPERTENSION: ICD-10-CM

## 2018-09-25 LAB
BUN SERPL-MCNC: 19 MG/DL (ref 7–30)
CALCIUM SERPL-MCNC: 9.3 MG/DL (ref 8.5–10.4)
CHLORIDE SERPLBLD-SCNC: 105 MMOL/L (ref 94–109)
CO2 SERPL-SCNC: 27 MMOL/L (ref 20–32)
CREAT SERPL-MCNC: 0.9 MG/DL (ref 0.6–1.3)
EGFR CALCULATED (BLACK REFERENCE): 76.9 ML/MIN
EGFR CALCULATED (NON BLACK REFERENCE): 63.6 ML/MIN
GLUCOSE SERPL-MCNC: 127 MG/DL (ref 60–109)
POTASSIUM SERPL-SCNC: 2.7 MMOL/L (ref 3.4–5.3)
SODIUM SERPL-SCNC: 144 MMOL/L (ref 133–144)

## 2018-09-25 RX ORDER — POTASSIUM CHLORIDE 1500 MG/1
20 TABLET, EXTENDED RELEASE ORAL 2 TIMES DAILY
Qty: 120 TABLET | Refills: 1 | Status: SHIPPED | OUTPATIENT
Start: 2018-09-25 | End: 2019-03-28

## 2018-10-16 DIAGNOSIS — E87.6 HYPOKALEMIA: ICD-10-CM

## 2018-10-16 LAB
BUN SERPL-MCNC: 23 MG/DL (ref 7–30)
CALCIUM SERPL-MCNC: 9.7 MG/DL (ref 8.5–10.4)
CHLORIDE SERPLBLD-SCNC: 103 MMOL/L (ref 94–109)
CO2 SERPL-SCNC: 24 MMOL/L (ref 20–32)
CREAT SERPL-MCNC: 1 MG/DL (ref 0.6–1.3)
EGFR CALCULATED (BLACK REFERENCE): 68.1 ML/MIN
EGFR CALCULATED (NON BLACK REFERENCE): 56.3 ML/MIN
GLUCOSE SERPL-MCNC: 130 MG/DL (ref 60–109)
POTASSIUM SERPL-SCNC: 3.8 MMOL/L (ref 3.4–5.3)
SODIUM SERPL-SCNC: 141 MMOL/L (ref 133–144)

## 2018-10-18 ENCOUNTER — OFFICE VISIT (OUTPATIENT)
Dept: FAMILY MEDICINE | Facility: CLINIC | Age: 83
End: 2018-10-18
Payer: COMMERCIAL

## 2018-10-18 VITALS
TEMPERATURE: 98.5 F | SYSTOLIC BLOOD PRESSURE: 133 MMHG | DIASTOLIC BLOOD PRESSURE: 73 MMHG | BODY MASS INDEX: 21.12 KG/M2 | HEART RATE: 72 BPM | OXYGEN SATURATION: 100 % | RESPIRATION RATE: 16 BRPM | WEIGHT: 97.6 LBS

## 2018-10-18 DIAGNOSIS — R07.9 CHEST PAIN, UNSPECIFIED TYPE: ICD-10-CM

## 2018-10-18 DIAGNOSIS — I10 BENIGN ESSENTIAL HYPERTENSION: Primary | ICD-10-CM

## 2018-10-18 DIAGNOSIS — E87.6 HYPOKALEMIA: ICD-10-CM

## 2018-10-18 NOTE — PROGRESS NOTES
Preceptor Attestation:  Patient's case reviewed and discussed with Amie Lockwood MD resident and I evaluated the patient. I agree with written assessment and plan of care.  Supervising Physician:  ADALI OCHOA MD  [facility]

## 2018-10-18 NOTE — PROGRESS NOTES
KATIE Toure is a 83 year old female who presents for:  Chief Complaint   Patient presents with     Hypertension     and labs f/u     Medication Reconciliation     completed. Needs attention. Unsure if supposed to be taking Losartan?        Hypertension follow up  - over the last couple months I have been following patient for hypertension  - had hypertensive urgency initially which is now controlled today  - currently on losartan 100mg daily + amlodipine 10mg daily  - initial cause for hypertensive urgency likely related to medication non-compliance.    Idiopathic hypokalemia  - also had hypokalemia that has now resolved with the addition of potassium supplementation x 3 weeks.  - levels low normal-hypokalemic. No obvious cause and interesting as patient is not on an ACEI    Chest pain  - on hypertension related ROS, she does have chest pain  - radiates to the bottom of her thorax  - not truly associated with exertion, but difficult to assess given patient's limited mobility  - has been the same over the last year.  - also has some dsypnea at baseline.  - has had EKG in clinic in the last couple months that was negative for acute ischemic changes - reviewed again today    A Cris  was used for this visit.     ROS: Complete 6 point ROS completed and negative other than stated above.    Social: Lives with her son         Physical Exam:     Vitals:    10/18/18 1007   BP: 133/73   Pulse: 72   Resp: 16   Temp: 98.5  F (36.9  C)   TempSrc: Oral   SpO2: 100%   Weight: 97 lb 9.6 oz (44.3 kg)     Body mass index is 21.12 kg/(m^2).  Vitals were reviewed and were normal    General: Alert and orientated in NAD. Pleasant and cooperative.   Cards: RRR, no murmurs, rubs or gallops. No lower extremity edema  Resp: clear vesicular breath sounds bilaterally, no crackles or wheezes. No increased work of breathing  Psych: mood good, affect congruent        Results:     Results for orders placed or performed in  visit on 10/16/18   Basic Metabolic Panel ([facility]) - Results < 1 hr   Result Value Ref Range    Glucose 130.0 (H) 60.0 - 109.0 mg/dL    Urea Nitrogen 23.0 7.0 - 30.0 mg/dL    Creatinine 1.0 0.6 - 1.3 mg/dL    Sodium 141.0 133.0 - 144.0 mmol/L    Potassium 3.8 3.4 - 5.3 mmol/L    Chloride 103.0 94.0 - 109.0 mmol/L    Carbon Dioxide 24.0 20.0 - 32.0 mmol/L    Calcium 9.7 8.5 - 10.4 mg/dL    eGFR Calculated (Non Black Reference) 56.3 (L) >60.0 mL/min    eGFR Calculated (Black Reference) 68.1 >60.0 mL/min         Assessment and Plan     1. Benign essential hypertension  Within goal today while on losartan 100mg daily and amlodipine 10mg daily  - follow up in 2 months for BMP and BP check    2. Hypokalemia  No obvious cause and unusual while on ACEI.  - continue on potassium supplementation  - bmp in 2 months    3. Chest pain, unspecified type  Had long discussion with patient's son today. Difficult hx from patient with some baseline cognitive decline. Discuss stress test and possible future treatments including medication and surgery. Patient has had borderline low HR in the past in clinic so addition of BB could cause worsening symptoms. After having a risks vs benefits discussion with possible positive stress test, we agreed to monitor for now. If symptoms worsen or become more frequent will have re-discussion.  - continue to monitor    Follow up in 2 months for BP, BMP, and symptom check.    Options for treatment and follow-up care were reviewed with the patient. Ranjeet Toure  engaged in the decision making process and verbalized understanding of the options discussed and agreed with the final plan.    Precepted with: MD Amie Skaggs MD (PGY3)  Pager: [phone]  [facility] Resident

## 2018-10-18 NOTE — NURSING NOTE
Chief Complaint   Patient presents with     Hypertension     and labs f/u     Medication Reconciliation     completed. Needs attention. Unsure if supposed to be taking Losartan?        /73  Pulse 72  Temp 98.5  F (36.9  C) (Oral)  Resp 16  Wt 97 lb 9.6 oz (44.3 kg)  SpO2 100%  BMI 21.12 kg/m2      Due to patient being non-English speaking/uses sign language, an  was used for this visit. Only for face-to-face interpretation by an external agency, date and length of interpretation can be found on the scanned worksheet.       name: Tah Anderson  Language: Cris   Agency: [employer]  Phone number: [phone]  Type of interpretation: Face to to Face, spoken

## 2018-10-18 NOTE — MR AVS SNAPSHOT
After Visit Summary   10/18/2018    Ranjeet Toure    MRN: [id number]           Patient Information     Date Of Birth          1935        Visit Information        Provider Department      10/18/2018 9:40 AM Amie Lockwood MD [facility]        Today's Diagnoses     Benign essential hypertension    -  1    Hypokalemia        Chest pain, unspecified type           Follow-ups after your visit        Your next 10 appointments already scheduled     Dec 19, 2018  2:00 PM CST   Return Visit with Amie Lockwood MD   [facility] ([facility])    [address] [place] [id number]   [phone]              Who to contact     Please call your clinic at [phone] to:    Ask questions about your health    Make or cancel appointments    Discuss your medicines    Learn about your test results    Speak to your doctor            Additional Information About Your Visit        Care EveryWhere ID     This is your Care EveryWhere ID. This could be used by other organizations to access your [facility] medical records  [id number]        Your Vitals Were     Pulse Temperature Respirations Pulse Oximetry BMI (Body Mass Index)       72 98.5  F (36.9  C) (Oral) 16 100% 21.12 kg/m2        Blood Pressure from Last 3 Encounters:   10/18/18 133/73   09/18/18 145/72   09/04/18 147/74    Weight from Last 3 Encounters:   10/18/18 97 lb 9.6 oz (44.3 kg)   09/18/18 100 lb 9.6 oz (45.6 kg)   09/04/18 99 lb 12.8 oz (45.3 kg)              We Performed the Following      : Sign Language or Oral - 68-82 minutes        Primary Care Provider Office Phone # Fax #    Amie Lockwood [phone]       [facility] [address] [place] [address]        Equal Access to Services     HELEN DE LA FUENTE AH: Hadii ines krishnamurthy Somayte, waaxda luqadaha, qaybta kaalmada adeegyada, johnathan colby. Valentina [facility] [phone].    ATENCIÓN: Si  kathy alexandra, tiene a mooney disposición servicios gratuitos de asistencia lingüística. Jelly reddy [phone].    We comply with applicable federal civil rights laws and [place] laws. We do not discriminate on the basis of race, color, national origin, age, disability, sex, sexual orientation, or gender identity.            Thank you!     Thank you for choosing [facility]  for your care. Our goal is always to provide you with excellent care. Hearing back from our patients is one way we can continue to improve our services. Please take a few minutes to complete the written survey that you may receive in the mail after your visit with us. Thank you!             Your Updated Medication List - Protect others around you: Learn how to safely use, store and throw away your medicines at www.disposemymeds.org.          This list is accurate as of 10/18/18 11:59 PM.  Always use your most recent med list.                   Brand Name Dispense Instructions for use Diagnosis    acetaminophen 500 MG tablet    TYLENOL    100 tablet    Take 2 tablets (1,000 mg) by mouth 2 times daily as needed for mild pain    Chronic midline low back pain, with sciatica presence unspecified       amLODIPine 10 MG tablet    NORVASC    90 tablet    Take 1 tablet (10 mg) by mouth daily    Benign essential hypertension       glycerin (adult) 2 g Supp Suppository    [facility] GLYCERIN ADULT    1 suppository    Place 1 suppository rectally daily as needed for constipation    Constipation, unspecified constipation type       hypromellose-dextran 0.3-0.1% 0.1-0.3 % Soln ophthalmic solution   Generic drug:  hypromellose-dextran      Apply 1-2 drops to eye 4 times daily as needed        losartan 100 MG tablet    COZAAR    90 tablet    Take 1 tablet (100 mg) by mouth daily    Benign essential hypertension       potassium chloride SA 20 MEQ CR tablet    KLOR-CON    120 tablet    Take 1 tablet (20 mEq) by mouth 2 times daily    Hypokalemia

## 2018-12-19 ENCOUNTER — OFFICE VISIT (OUTPATIENT)
Dept: FAMILY MEDICINE | Facility: CLINIC | Age: 83
End: 2018-12-19
Payer: COMMERCIAL

## 2018-12-19 VITALS
TEMPERATURE: 98 F | OXYGEN SATURATION: 98 % | HEART RATE: 78 BPM | WEIGHT: 103.4 LBS | BODY MASS INDEX: 21.7 KG/M2 | DIASTOLIC BLOOD PRESSURE: 62 MMHG | HEIGHT: 58 IN | SYSTOLIC BLOOD PRESSURE: 130 MMHG

## 2018-12-19 DIAGNOSIS — I10 BENIGN ESSENTIAL HYPERTENSION: Primary | ICD-10-CM

## 2018-12-19 DIAGNOSIS — E87.6 HYPOKALEMIA: ICD-10-CM

## 2018-12-19 LAB
BUN SERPL-MCNC: 14 MG/DL (ref 7–30)
CALCIUM SERPL-MCNC: 9.1 MG/DL (ref 8.5–10.4)
CHLORIDE SERPLBLD-SCNC: 102 MMOL/L (ref 94–109)
CO2 SERPL-SCNC: 29 MMOL/L (ref 20–32)
CREAT SERPL-MCNC: 0.9 MG/DL (ref 0.6–1.3)
EGFR CALCULATED (BLACK REFERENCE): 76.9 ML/MIN
EGFR CALCULATED (NON BLACK REFERENCE): 63.6 ML/MIN
GLUCOSE SERPL-MCNC: 112 MG/DL (ref 60–109)
POTASSIUM SERPL-SCNC: 3 MMOL/L (ref 3.4–5.3)
SODIUM SERPL-SCNC: 141 MMOL/L (ref 133–144)

## 2018-12-19 ASSESSMENT — MIFFLIN-ST. JEOR: SCORE: 808.02

## 2018-12-19 NOTE — PROGRESS NOTES
"       KATIE Toure is a 83 year old female who presents for:  Chief Complaint   Patient presents with     Hypertension     Medication Reconciliation       Hypertension  Hypokalemia  - was seen previously for hypertensive urgency  - restarted on home medications and titrated up  - last seen 2 months at which time BP was at goal and she was to continue on losartan 100mg daily + amlodipine 10mg daily + 20mEq KCl bid  - today they have amlodipine and potassium with them. They have been out of the losartan  - she has no headache, chest pain, dyspnea, or LE edema  - she has no orthostasis  BP Readings from Last 6 Encounters:   12/19/18 130/62   10/18/18 133/73   09/18/18 145/72   09/04/18 147/74   08/27/18 157/67   08/23/18 (!) 219/75       A [employer]  was used for this visit.     ROS: Complete 6 point ROS completed and negative other than stated above.    Social: her son and grandson are present today whom she lives with         Physical Exam:     Vitals:    12/19/18 1403   BP: 130/62   Pulse: 78   Temp: 98  F (36.7  C)   TempSrc: Oral   SpO2: 98%   Weight: 46.9 kg (103 lb 6.4 oz)   Height: 1.464 m (4' 9.64\")     Body mass index is 21.88 kg/m .  Vitals were reviewed and were normal    General: Alert and orientated in NAD. Pleasant and cooperative.   Cards: RRR, no murmurs, rubs or gallops. 1-2+ pitting bilateral lower extremity edema  Resp: clear vesicular breath sounds bilaterally, no crackles or wheezes. No increased work of breathing  Psych: mood good, affect congruent        Results:     Results for orders placed or performed in visit on 10/16/18   Basic Metabolic Panel ([facility]) - Results < 1 hr   Result Value Ref Range    Glucose 130.0 (H) 60.0 - 109.0 mg/dL    Urea Nitrogen 23.0 7.0 - 30.0 mg/dL    Creatinine 1.0 0.6 - 1.3 mg/dL    Sodium 141.0 133.0 - 144.0 mmol/L    Potassium 3.8 3.4 - 5.3 mmol/L    Chloride 103.0 94.0 - 109.0 mmol/L    Carbon Dioxide 24.0 20.0 - 32.0 mmol/L    Calcium 9.7 8.5 - 10.4 " mg/dL    eGFR Calculated (Non Black Reference) 56.3 (L) >60.0 mL/min    eGFR Calculated (Black Reference) 68.1 >60.0 mL/min       Assessment and Plan     1. Benign essential hypertension  2. Hypokalemia  Confusing picture as today's BP is within goal on monotherapy with amlodipine. Previously very difficult to control on monotherapy and requiring max dose of losartan and amlodipine. BMP unremarkable today. Having some LE edema but not bothersome to patient. As BP is within goal today with current monotherapy will continue with amlodipine 10mg daily and potassium.    Follow up in 1 month for repeat BP and BMP check    Options for treatment and follow-up care were reviewed with the patient. Ranjeet Toure  engaged in the decision making process and verbalized understanding of the options discussed and agreed with the final plan.    Precepted with: MD Amei Guerrero MD (PGY3)  Pager: [phone]  [facility] Resident

## 2018-12-19 NOTE — PROGRESS NOTES
Preceptor Attestation:   Patient seen, evaluated and discussed with the resident. I have verified the content of the note, which accurately reflects my assessment of the patient and the plan of care.  Supervising Physician:Davy Coughlin MD  [facility]

## 2018-12-27 NOTE — NURSING NOTE
Due to patient being non-English speaking/uses sign language, an  was used for this visit. Only for face-to-face interpretation by an external agency, date and length of interpretation can be found on the scanned worksheet.     name: Bird White  Agency: [employer]  Language: Cris   Telephone number: [phone]  Type of interpretation: Face-to-face, spoken

## 2019-01-08 ENCOUNTER — OFFICE VISIT (OUTPATIENT)
Dept: FAMILY MEDICINE | Facility: CLINIC | Age: 84
End: 2019-01-08
Payer: COMMERCIAL

## 2019-01-08 VITALS
OXYGEN SATURATION: 96 % | WEIGHT: 100.6 LBS | HEART RATE: 76 BPM | HEIGHT: 57 IN | DIASTOLIC BLOOD PRESSURE: 66 MMHG | BODY MASS INDEX: 21.7 KG/M2 | RESPIRATION RATE: 22 BRPM | TEMPERATURE: 97.4 F | SYSTOLIC BLOOD PRESSURE: 130 MMHG

## 2019-01-08 DIAGNOSIS — E87.6 HYPOKALEMIA: ICD-10-CM

## 2019-01-08 DIAGNOSIS — R63.0 ANOREXIA: ICD-10-CM

## 2019-01-08 DIAGNOSIS — R05.9 COUGH: Primary | ICD-10-CM

## 2019-01-08 LAB
% GRANULOCYTES: 51.4 %G (ref 40–75)
BUN SERPL-MCNC: 19 MG/DL (ref 7–30)
CALCIUM SERPL-MCNC: 9.5 MG/DL (ref 8.5–10.4)
CHLORIDE SERPLBLD-SCNC: 105 MMOL/L (ref 94–109)
CO2 SERPL-SCNC: 25 MMOL/L (ref 20–32)
CREAT SERPL-MCNC: 0.9 MG/DL (ref 0.6–1.3)
EGFR CALCULATED (BLACK REFERENCE): 76.7 ML/MIN
EGFR CALCULATED (NON BLACK REFERENCE): 63.4 ML/MIN
FLUAV AG UPPER RESP QL IA.RAPID: NEGATIVE
FLUBV AG UPPER RESP QL IA.RAPID: NEGATIVE
GLUCOSE SERPL-MCNC: 135 MG/DL (ref 60–109)
GRANULOCYTES #: 3.5 K/UL (ref 1.6–8.3)
HCT VFR BLD AUTO: 41.4 % (ref 35–47)
HEMOGLOBIN: 12.7 G/DL (ref 11.7–15.7)
LYMPHOCYTES # BLD AUTO: 2.5 K/UL (ref 0.8–5.3)
LYMPHOCYTES NFR BLD AUTO: 37.3 %L (ref 20–48)
MCH RBC QN AUTO: 30.8 PG (ref 26.5–35)
MCHC RBC AUTO-ENTMCNC: 30.7 G/DL (ref 32–36)
MCV RBC AUTO: 100.2 FL (ref 78–100)
MID #: 0.8 K/UL (ref 0–2.2)
MID %: 11.3 %M (ref 0–20)
PLATELET # BLD AUTO: 238 K/UL (ref 150–450)
POTASSIUM SERPL-SCNC: 3.7 MMOL/L (ref 3.4–5.3)
RBC # BLD AUTO: 4.13 M/UL (ref 3.8–5.2)
SODIUM SERPL-SCNC: 143 MMOL/L (ref 133–144)
WBC # BLD AUTO: 6.8 K/UL (ref 4–11)

## 2019-01-08 RX ORDER — INHALER, ASSIST DEVICES
SPACER (EA) MISCELLANEOUS
Qty: 1 EACH | Refills: 0 | Status: SHIPPED | OUTPATIENT
Start: 2019-01-08 | End: 2019-01-30

## 2019-01-08 RX ORDER — ALBUTEROL SULFATE 90 UG/1
2 AEROSOL, METERED RESPIRATORY (INHALATION) EVERY 6 HOURS
Qty: 8.5 G | Refills: 0 | Status: SHIPPED | OUTPATIENT
Start: 2019-01-08 | End: 2022-07-13

## 2019-01-08 RX ORDER — AZITHROMYCIN 250 MG/1
TABLET, FILM COATED ORAL
Qty: 6 TABLET | Refills: 0 | Status: SHIPPED | OUTPATIENT
Start: 2019-01-08 | End: 2019-05-01

## 2019-01-08 ASSESSMENT — MIFFLIN-ST. JEOR: SCORE: 780.2

## 2019-01-08 NOTE — NURSING NOTE
Due to patient being non-English speaking/uses sign language, an  was used for this visit. Only for face-to-face interpretation by an external agency, date and length of interpretation can be found on the scanned worksheet.     name: Jefe Mayberry  Agency: [employer]  Language: Cris   Telephone number: [phone]  Type of interpretation: Face-to-face, spoken

## 2019-01-08 NOTE — PROGRESS NOTES
"History   Ranjeet Toure is a 84 year old female presenting for:  Sick (cough and fever)    Fever- subjective. No sweats.   Not eating well. Dizzy.   Cough. Can't sleep at night. Congestion.   Hard to breathe. Sometimes worse at night and gets up to sit in chair.  Used to have more swelling in legs - has improved recently.   Taking meds regularly (amlodipine, potassium) though worried it might be causing stomach pain.   Chest pain - with breathing and cough. Not with walking around.     A [employer]  was used for this visit.   Medical and social history and medications reviewed with patient.   Exam   /66   Pulse 76   Temp 97.4  F (36.3  C)   Resp 22   Ht 1.448 m (4' 9\")   Wt 45.6 kg (100 lb 9.6 oz)   SpO2 96%   BMI 21.77 kg/m    Gen: moderate distress. Difficulty taking deep breath. Blowing nose frequently.   HEENT: white mucous in nose. TMs clear. Oropharynx no exudate or erythema.   Card: RRR, no murmurs  Resp: faint bibasilar crackles. Left middle posterior region with intermittent wheeze.   Abd: soft nontender  Ext: trace LE edema  Psych: mood normal, affect congruent  Medical Decision-Making     1. Cough  Duration of illness and congestion more consistent with viral illness, but patient is elderly and appears ill clinically. Flu negative and no leukocytosis. Possible right lower lobe developing infiltrate. Will treat with azithromycin and give albuterol for shortness of breath.   - XR CHEST 2 VW  - CBC with Diff Plt ([facility])  - Influenza A/B Antigen ([facility])  - azithromycin (ZITHROMAX) 250 MG tablet; Take 2 tablets (500 mg) by mouth daily for 1 day, THEN 1 tablet (250 mg) daily for 4 days.  Dispense: 6 tablet; Refill: 0  - albuterol (PROAIR HFA/PROVENTIL HFA/VENTOLIN HFA) 108 (90 Base) MCG/ACT inhaler; Inhale 2 puffs into the lungs every 6 hours  Dispense: 8.5 g; Refill: 0  - spacer (OPTICHAMBER MARIA A) holding chamber; Use with albuterol inhaler  Dispense: 1 each; Refill: 0  -  : " Sign Language or Oral - 83-97 minutes    2. Anorexia  3. Hypokalemia  On potassium replacement. Now not eating as well.   - Basic Metabolic Panel ([facility]) - Results < 1 hr    Follow up: 1 month with Dr. Lockwood for BP; later this week if worsening symptoms    Ruthy Lim MD, MPH  [facility] Resident     Precepted patient with Doris Segovia DO    Options for treatment and follow-up care were reviewed with the patient and/or guardian. Ku Paw and/or guardian engaged in the decision making process and verbalized understanding of the options discussed and agreed with the final plan.

## 2019-01-11 ENCOUNTER — TELEPHONE (OUTPATIENT)
Dept: FAMILY MEDICINE | Facility: CLINIC | Age: 84
End: 2019-01-11

## 2019-01-11 DIAGNOSIS — R05.9 COUGH: Primary | ICD-10-CM

## 2019-01-11 RX ORDER — INHALER, ASSIST DEVICES
SPACER (EA) MISCELLANEOUS
Qty: 1 EACH | Refills: 0 | Status: SHIPPED | OUTPATIENT
Start: 2019-01-11

## 2019-01-11 NOTE — TELEPHONE ENCOUNTER
Notified per Charleen that Optichamber not covered. In review of [employer] formulary, Aerochamber preferred product. Reordered per CPA.     Laquita Sal, PharmD  [facility]  Phone: [phone]  January 11, 2019 at 10:38 AM

## 2019-01-12 NOTE — PROGRESS NOTES
Preceptor Attestation:   Patient seen, evaluated and discussed with the resident. I personally reviewed the imaging and agree with the interpretation documented by the resident. I have verified the content of the note, which accurately reflects my assessment of the patient and the plan of care.  Supervising Physician:Doris Segovia DO [facility]

## 2019-01-30 ENCOUNTER — OFFICE VISIT (OUTPATIENT)
Dept: FAMILY MEDICINE | Facility: CLINIC | Age: 84
End: 2019-01-30
Payer: COMMERCIAL

## 2019-01-30 VITALS
WEIGHT: 101.8 LBS | TEMPERATURE: 98.1 F | OXYGEN SATURATION: 99 % | DIASTOLIC BLOOD PRESSURE: 68 MMHG | SYSTOLIC BLOOD PRESSURE: 129 MMHG | HEART RATE: 75 BPM | BODY MASS INDEX: 21.96 KG/M2 | HEIGHT: 57 IN | RESPIRATION RATE: 22 BRPM

## 2019-01-30 DIAGNOSIS — I10 BENIGN ESSENTIAL HYPERTENSION: Primary | ICD-10-CM

## 2019-01-30 DIAGNOSIS — Z23 IMMUNIZATION DUE: ICD-10-CM

## 2019-01-30 DIAGNOSIS — E87.6 HYPOKALEMIA: ICD-10-CM

## 2019-01-30 DIAGNOSIS — R07.89 CHEST WALL PAIN: ICD-10-CM

## 2019-01-30 ASSESSMENT — MIFFLIN-ST. JEOR: SCORE: 785.64

## 2019-01-30 NOTE — NURSING NOTE
Due to patient being non-English speaking/uses sign language, an  was used for this visit. Only for face-to-face interpretation by an external agency, date and length of interpretation can be found on the scanned worksheet.     name: Jimmy [id number]  Agency: [employer]  Language: Hmong   Telephone number: NA  Type of interpretation: Telemedicine, spoken  Injectable influenza vaccine documentation    1. Has the patient received the information for the influenza vaccine? YES    2. Does the patient have a severe allergy to eggs (Patients with a severe egg allergy should be assessed by a medical provider, RN, or clinical pharmacist. If they receive the influenza vaccine, please have them observed for 15 minutes.)? No    3. Has the patient had an allergic reaction to previous influenza vaccines? No    4. Has the patient had any severe allergic reactions to past influenza vaccines ? No       5. Does patient have a history of Guillain-[place] syndrome? No      Based on responses above, I administered the influenza vaccine.  FILOMENA Freeman

## 2019-01-30 NOTE — PROGRESS NOTES
"       KATIE Toure is a 84 year old female who presents for:  Chief Complaint   Patient presents with     RECHECK     BP     Medication Reconciliation     Completed       Hypertension  - previously uncontrolled severe HTN that was under control with combination therapy.  - interestingly, now well controlled on monotherapy with amlodipine  - compliant with medication, no difficulties with use  - no orthostatic hypotension  BP Readings from Last 6 Encounters:   01/30/19 129/68   01/08/19 130/66   12/19/18 130/62   10/18/18 133/73   09/18/18 145/72   09/04/18 147/74     Hypokalemia  - no great etiology other than reduced po intake.  - currently on 20mEq bid     Chest wall pain  - since recent uri that was treated with azithromycin 1/8 she has been having some pain of her chest wall  - located on her left mid-thoracic region  - worsens when she takes a deep breath in and when she twists her trunk.  - no alleviating factors.    Of note, she was previously treated for an upper airway illness with azithromycin. Currently feeling well and nearly back to baseline.    [employer] interpretor was used.     ROS: Complete 6 point ROS completed and negative other than stated above.    Social: present today was her son         Physical Exam:     Vitals:    01/30/19 1045   BP: 129/68   Pulse: 75   Resp: 22   Temp: 98.1  F (36.7  C)   SpO2: 99%   Weight: 46.2 kg (101 lb 12.8 oz)   Height: 1.448 m (4' 9\")     Body mass index is 22.03 kg/m .  Vitals were reviewed and were normal    General: Alert and orientated in NAD. Pleasant and cooperative.   Cards: RRR, no murmurs, rubs or gallops. No lower extremity edema  Resp: somewhat reduced bilateral breath sounds. Clear vesicular breath sounds bilaterally, no crackles or wheezes. No increased work of breathing  MSK: reproducible pain with palpation to left rib 6-7.  Neuro: facial twitching most often shima-oral  Psych: mood good, affect congruent        Results:     Last Comprehensive " Metabolic Panel:  Sodium   Date Value Ref Range Status   01/08/2019 143.0 133.0 - 144.0 mmol/L Final     Potassium   Date Value Ref Range Status   01/08/2019 3.7 3.4 - 5.3 mmol/L Final     Chloride   Date Value Ref Range Status   01/08/2019 105.0 94.0 - 109.0 mmol/L Final     Carbon Dioxide   Date Value Ref Range Status   01/08/2019 25.0 20.0 - 32.0 mmol/L Final     Glucose   Date Value Ref Range Status   01/08/2019 135.0 (H) 60.0 - 109.0 mg/dL Final     Urea Nitrogen   Date Value Ref Range Status   01/08/2019 19.0 7.0 - 30.0 mg/dL Final     Creatinine   Date Value Ref Range Status   01/08/2019 0.9 0.6 - 1.3 mg/dL Final     GFR Estimate   Date Value Ref Range Status   04/18/2016 >60 >60 mL/min/1.73m2 Final     Calcium   Date Value Ref Range Status   01/08/2019 9.5 8.5 - 10.4 mg/dL Final       Assessment and Plan     1. Benign essential hypertension  BP at goal today. BMP UTD. No side effects and tolerating medication well.  - continue pta amlodipine 10mg daily    2. Hypokalemia  Most recent BMP with K wnl.  - continue on supplemetnation    3. Chest wall pain  Likely muscular/chondral sprain 2/2 recent upper airway illness.  - supportive cares, heat    4. Immunization due  - ADMIN VACCINE, INITIAL  - FLU VACCINE, INCREASED ANTIGEN, PRESV FREE    Follow up in 3 months for BP and BMP check, sooner if needed.     Options for treatment and follow-up care were reviewed with the patient. Ranjeet Toure  engaged in the decision making process and verbalized understanding of the options discussed and agreed with the final plan.    Precepted with: MD Amie Kim MD (PGY3)  Pager: [phone]  [facility] Resident

## 2019-01-31 NOTE — PROGRESS NOTES
I have personally reviewed the history and examination as documented by Dr. Lockwood.  I was present during key portions of the visit and agree with the assessment and plan as documented for 84 yr old female with HTN,hypokalemia here for follow-up, chest wall pain s/p recent URI. BP, K are stable. Chest wall pain likely msk; Precautions given. Anticipatory guidance given.     Amilcar Avina MD  January 31, 2019  8:37 AM

## 2019-03-28 DIAGNOSIS — E87.6 HYPOKALEMIA: ICD-10-CM

## 2019-03-29 RX ORDER — POTASSIUM CHLORIDE 1500 MG/1
20 TABLET, EXTENDED RELEASE ORAL 2 TIMES DAILY
Qty: 180 TABLET | Refills: 3 | Status: SHIPPED | OUTPATIENT
Start: 2019-03-29 | End: 2020-04-01

## 2019-04-30 NOTE — PROGRESS NOTES
"       KATIE Toure is a 84 year old female who presents for follow up of:    Hypertension  - previously uncontrolled severe HTN that was under control with combination therapy.  - interestingly, now well controlled on monotherapy with amlodipine 10mg daily  - considered hyperaldosteronism, but sodium wnl and now well controlled with monotherapy.  - compliant with medication, no difficulties with use, family member helps remind her to take it  - no orthostatic hypotension    Hypokalemia  - no obvious etiology other than reduced po intake.  - currently on 20mEq bid     [employer]  was used for this visit.     ROS: Complete 6 point ROS completed and negative other than stated above.    Social: Lives at home with her son.         Physical Exam:     Vitals:    05/01/19 1446   BP: 133/68   Pulse: 76   Resp: 20   Temp: 98.7  F (37.1  C)   SpO2: 98%   Weight: 47.3 kg (104 lb 3.2 oz)   Height: 1.448 m (4' 9\")     Body mass index is 22.55 kg/m .  Vitals were reviewed and were normal    General: Alert and orientated in NAD. Pleasant and cooperative.   Cards: RRR, no murmurs, rubs or gallops. Trace bilateral lower extremity edema mainly at the feet and ankles.  Resp: clear vesicular breath sounds bilaterally, no crackles or wheezes. No increased work of breathing  Psych: mood good, affect congruent        Results:     Results for orders placed or performed in visit on 05/01/19   Basic Metabolic Panel ([facility]) - Results < 1 hr   Result Value Ref Range    Glucose 107.0 60.0 - 109.0 mg/dL    Urea Nitrogen 17.0 7.0 - 30.0 mg/dL    Creatinine 0.9 0.6 - 1.3 mg/dL    Sodium 143.0 133.0 - 144.0 mmol/L    Potassium 3.5 3.4 - 5.3 mmol/L    Chloride 105.0 94.0 - 109.0 mmol/L    Carbon Dioxide 26.0 20.0 - 32.0 mmol/L    Calcium 9.3 8.5 - 10.4 mg/dL    eGFR Calculated (Non Black Reference) 63.4 >60.0 mL/min    eGFR Calculated (Black Reference) 76.7 >60.0 mL/min         Assessment and Plan     1. Benign essential " hypertension  Well controlled still no monotherapy with amlodipine w/o side effects. Will continue on amlodipine 10mg daily.  - Basic Metabolic Panel ([facility]) - Results < 1 hr    2. Hypokalemia  Still wnl today while on supplementation. Will continue on potassium 20mEq bid.  - Basic Metabolic Panel ([facility]) - Results < 1 hr    Follow up for wellness examination whenever possible.   Follow up in 6 months for BP check and BMP check.    Options for treatment and follow-up care were reviewed with the patient. Ranjeet Toure  engaged in the decision making process and verbalized understanding of the options discussed and agreed with the final plan.    Precepted with: MD Amie Cabezas MD (PGY3)  Pager: [phone]  [facility] Resident

## 2019-05-01 ENCOUNTER — OFFICE VISIT (OUTPATIENT)
Dept: FAMILY MEDICINE | Facility: CLINIC | Age: 84
End: 2019-05-01
Payer: COMMERCIAL

## 2019-05-01 VITALS
DIASTOLIC BLOOD PRESSURE: 68 MMHG | BODY MASS INDEX: 22.48 KG/M2 | WEIGHT: 104.2 LBS | TEMPERATURE: 98.7 F | HEIGHT: 57 IN | SYSTOLIC BLOOD PRESSURE: 133 MMHG | HEART RATE: 76 BPM | OXYGEN SATURATION: 98 % | RESPIRATION RATE: 20 BRPM

## 2019-05-01 DIAGNOSIS — E87.6 HYPOKALEMIA: ICD-10-CM

## 2019-05-01 DIAGNOSIS — I10 BENIGN ESSENTIAL HYPERTENSION: Primary | ICD-10-CM

## 2019-05-01 LAB
BUN SERPL-MCNC: 17 MG/DL (ref 7–30)
CALCIUM SERPL-MCNC: 9.3 MG/DL (ref 8.5–10.4)
CHLORIDE SERPLBLD-SCNC: 105 MMOL/L (ref 94–109)
CO2 SERPL-SCNC: 26 MMOL/L (ref 20–32)
CREAT SERPL-MCNC: 0.9 MG/DL (ref 0.6–1.3)
EGFR CALCULATED (BLACK REFERENCE): 76.7 ML/MIN
EGFR CALCULATED (NON BLACK REFERENCE): 63.4 ML/MIN
GLUCOSE SERPL-MCNC: 107 MG/DL (ref 60–109)
POTASSIUM SERPL-SCNC: 3.5 MMOL/L (ref 3.4–5.3)
SODIUM SERPL-SCNC: 143 MMOL/L (ref 133–144)

## 2019-05-01 ASSESSMENT — MIFFLIN-ST. JEOR: SCORE: 796.53

## 2019-05-01 NOTE — PROGRESS NOTES
Preceptor Attestation:   Patient seen, evaluated and discussed with the resident. I have verified the content of the note, which accurately reflects my assessment of the patient and the plan of care.  Supervising Physician:Evy Prado MD  [facility]

## 2019-05-01 NOTE — NURSING NOTE
Due to patient being non-English speaking/uses sign language, an  was used for this visit. Only for face-to-face interpretation by an external agency, date and length of interpretation can be found on the scanned worksheet.     name: Eamon Rodriguez  Agency: [employer]  Language: Cris   Telephone number: [phone]  Type of interpretation: Face-to-face, spoken

## 2019-06-07 ENCOUNTER — OFFICE VISIT (OUTPATIENT)
Dept: FAMILY MEDICINE | Facility: CLINIC | Age: 84
End: 2019-06-07
Payer: COMMERCIAL

## 2019-06-07 VITALS
RESPIRATION RATE: 16 BRPM | BODY MASS INDEX: 22.98 KG/M2 | OXYGEN SATURATION: 97 % | TEMPERATURE: 97.7 F | HEART RATE: 59 BPM | WEIGHT: 106.2 LBS | SYSTOLIC BLOOD PRESSURE: 127 MMHG | DIASTOLIC BLOOD PRESSURE: 67 MMHG

## 2019-06-07 DIAGNOSIS — Z00.00 WELLNESS EXAMINATION: Primary | ICD-10-CM

## 2019-06-07 DIAGNOSIS — Z66 DNR (DO NOT RESUSCITATE): ICD-10-CM

## 2019-06-07 DIAGNOSIS — R54 FRAILTY: ICD-10-CM

## 2019-06-07 NOTE — LETTER
June 18, 2019      Ranjeet Paw  [address] [place] [address]        Dear Ranjeet,  You has osteoporosis which puts your at risk of a bone fracture if you falls. I would like to start your on a once weekly medication which has been sent to her pharmacy. You will take this pill one time per week. The pharmacist will tell her more about when to take it/how to take it. We should follow her blood work in 6 months, so she should have an office visit with us in 6 months to follow up on this lab work and her blood pressure. Please call the clinic with any further questions or concerns.          If you have any questions, please call the clinic to make an appointment.    Sincerely,    Amie Lockwood MD

## 2019-06-07 NOTE — PROGRESS NOTES
Annual Wellness Visit for 65 years and older    HPI  This 84 year old female presents as an established patient  Amie Lockwood who presents for an annual wellness visit.    Other issues patient wants to be addressed today:  Chief Complaint   Patient presents with     Wellness Visit     No other concerns     Medication Reconciliation     Completed       Patient Active Problem List   Diagnosis     Benign essential hypertension     Chronic hepatitis B virus infection (H)     Tear film insufficiency     Hearing loss     Osteoarthrosis involving lower leg     Chest pain, unspecified type     Hypokalemia     Past Medical History:   Diagnosis Date     Benign essential hypertension 7/22/2015     CKD (chronic kidney disease) stage 3, GFR 30-59 ml/min (H) 10/5/2017       Family History   Problem Relation Age of Onset     Diabetes No family hx of      Coronary Artery Disease No family hx of      Hypertension No family hx of      Hyperlipidemia No family hx of      Breast Cancer No family hx of      Cancer - colorectal No family hx of      Ovarian Cancer No family hx of      Prostate Cancer No family hx of      Other Cancer No family hx of      Mental Illness No family hx of      Cerebrovascular Disease No family hx of      Anesthesia Reaction No family hx of      Asthma No family hx of      Osteoporosis No family hx of      Known Genetic Syndrome No family hx of      Obesity No family hx of      Unknown/Adopted No family hx of      Colon Cancer No family hx of      Depression No family hx of      Anxiety Disorder No family hx of      Mental Illness No family hx of      Substance Abuse No family hx of      Genetic Disorder No family hx of      Thyroid Disease No family hx of      Problem List, Family History and past Medical History reviewed and  unchanged/updated.    Are you sexually active?  No     FOR WOMEN  What year did you stop having periods? unknown  Any vaginal bleeding in the last year? No  Have you ever had  an abnormal Pap smear? No    Frailty Assessment  1. Weight loss (>5% in year)  No  Wt Readings from Last 5 Encounters:   06/07/19 48.2 kg (106 lb 3.2 oz)   05/01/19 47.3 kg (104 lb 3.2 oz)   01/30/19 46.2 kg (101 lb 12.8 oz)   01/08/19 45.6 kg (100 lb 9.6 oz)   12/19/18 46.9 kg (103 lb 6.4 oz)       2. Exhaustion (perceived effort for a given activity)   How difficult is walking from one room to the other on the same level?moderately   Yes     3. Weakness (handgrip strength)   How difficult is lifting or carrying something as heavy as 10 pounds? not   No    4. Decreased physical activity    Compared with most (men/women) your age, would you say  that you are more active, less active, or about the same? less   Yes    5. Slow gait speed (timed up and go > 12 sec.)  Yes  FALL RISK ASSESSMENT 6/7/2019 8/27/2018 8/14/2017   Fallen 2 or more times in the past year? No Yes Yes   Any fall with injury in the past year? No No Yes   Timed Up and Go Test/Seconds (13.5 is a fall risk; contact physician) - 32 37.83         Frailty screen score: 3    Frail Assessment:3-5 Frail    EVALUATION OF COGNITIVE FUNCTION  Mood/affect:Normal  Appearance:Normal  Family member/caregiver input: Normal    PHQ-2 Score:   PHQ-2 ( 1999 Pf[employer]) 6/7/2019 5/1/2019   Q1: Little interest or pleasure in doing things 0 0   Q2: Feeling down, depressed or hopeless 0 0   PHQ-2 Score 0 0       PHQ-9 Score:   [facility] Follow-up to PHQ 11/19/2015   PHQ-9 9. Suicide Ideation past 2 weeks Not at all       Mini Cog Test:  Patient unable to complete. She is not able to answer simple yes/no questions. Sometimes she does not even answer her family. This has been going on several years.    Cognitive screen is: Likely positive    Other Assessments:  CV Risk based on Pooled Cohort Risk; not assessed as this would be primary prevention in an 85yo female    ECG from August 2018 reviewed; first degree AV block, Q-waves in some anterior leads.    Corrected Visual acuity:  Attempted, but patient could not perform.    Advance Directives: Discussed with patient and family as appropriate. Has patient  completed advance directives and/or a living will? No    Discussed code status in detail today. With age and frailty, agree that she should be DNR, her chart was updated with this.    Immunization History   Administered Date(s) Administered     Influenza (High Dose) 3 valent vaccine 11/25/2015, 10/05/2017, 01/30/2019     Influenza Vaccine IM 3yrs+ 4 Valent IIV4 09/22/2014     Pneumo Conj 13-V (2010&after) 05/25/2017     Pneumococcal 23 valent 06/27/2013     Tdap (Adacel,Boostrix) 06/11/2014     Reviewed Immunization Record Today  Physical Exam  Vitals: /67   Pulse 59   Temp 97.7  F (36.5  C) (Oral)   Resp 16   Wt 48.2 kg (106 lb 3.2 oz)   SpO2 97%   BMI 22.98 kg/m    BMI= Body mass index is 22.98 kg/m .     General: Alert and orientated in NAD. Pleasant and cooperative.   HEENT: EOMI, sclera without injection or icterus. Mucus membranes moist  Cards: RRR, no murmurs, rubs or gallops. No lower extremity edema  Resp: clear vesicular breath sounds bilaterally, no crackles or wheezes. No increased work of breathing  MSK: moving all extremities w/o difficulty or deficit  Skin: no rashes, lesions, or lacerations  Psych: mood good, affect appropriate    Assessment and Plan:  Reviewed Preventive Services and Plan form with patient as specified in  Patient Instructions.    1. Wellness examination  - Dexa hip/pelvis/spine*  - order for DME; Equipment being ordered: 4 wheeled walker with seat.  Dispense: 1 Units; Refill: 0    2. Frailty  - order for DME; Equipment being ordered: 4 wheeled walker with seat.  Dispense: 1 Units; Refill: 0    3. DNR (do not resuscitate)  Discussed in detail today. Problem list updated with DNR. Honoring choices given as well. Encouraged to fill this out and return to clinic.    Options for treatment and follow-up care were reviewed with the [facility] [place]  and/or  guardian engaged in the decision making process and verbalized  understanding of the options discussed and agreed with the final plan.    Precepted with: MD Amie Kim MD (PGY3)  Pager: [phone]  [facility] Resident

## 2019-06-07 NOTE — NURSING NOTE
I attempted vision screening on Ranjeet Toure,pt is unable to read and had difficulty repeating letters back.  was telling the pt which letter it was which was not helpful with screening. I then used the child snellen chart and Ranjeet Toure had a hard time matching the letters. I was unsuccessful with vision screening.      Abena Carpenter, CMA

## 2019-06-07 NOTE — NURSING NOTE
Medicare Wellness Visit  Health Risk Assessment           Health Risk Assessment / Review of Systems     Constitutional: Any fevers or night sweats? No     Eyes:  Vision problems   No     Hearing Do you feel you have hearing loss?  No     Cardiovascular: Any chest pain, fast or irregular heart beat, calf pain with walking?     No           Respiratory:   Any breathing problems or cough?   No     Gastrointestinal: Any stomach or stool problems?   No      Genitourinary: Do you have difficulty controlling urination?   No     Muscles and Joints: Any joint stiffness or soreness?   No     Skin: Any concerning lesions or moles?   No     Nervous System: Any loss of strength or feeling, numbness or tingling, shaking, dizziness, or headache?  No     Mental Health: Any depression, anxiety or problems sleeping?    No     Cognition: Do you have any problems with your memory?   YES long and short term memory. Inability to answer most questions or recall words per PCA and patient's family.            Medical Care     What other specialists or organizations are involved in your medical care?    Patient Care Team       Relationship Specialty Notifications Start End    Amie Lockwood MD PCP - General Student in organized health care education/training program  7/1/17     Phone: [phone] Fax: [phone]         [facility] [place] [place] [address]          Have you been to the ER or overnight in the hospital in the last year?  No          Social History / Home Safety       Marital Status:  Who lives in your household? Self, children, grandchildren    Do you feel threatened or controlled by a partner, ex-partner or anyone in your life? No     Has anyone hurt you physically, for example by pushing, hitting, slapping or kicking you   or forcing you to have sex? No          Does your home have any of the following safety concerns; loose rugs in the hallway,  bathrooms with no grab bars by the tub or toilet,  stairs with no handrails or poorly lit areas?  No     Do you need help with dressing yourself, bathing, eating or getting around your home?   YES Needs assistance with all, family assists with all cares    Do you need help with the phone, transportation, shopping, preparing meals, housework, laundry, medications or managing money? YES Needs assistance with all, family assists with all cares      Risk Behaviors and Healthy Habits     History   Smoking Status     Never Smoker   Smokeless Tobacco     Never Used     How many servings of fruits and vegetables do you eat a day? 2-3/day    Exercise: None No exercise: patient tires easily and unable to walk at distance without breaks    Do you frequently drive without a seatbelt? No     Do you use tobacco?  No     Do you use any other drugs? No         Do you use alcohol?No      Frailty Assessment            Have you lost 10 or more pounds unintentionally in the previous year? No     How difficult is walking from one room to the other on the same level?moderately   Yes     Is it difficult to lift or carry something as heavy as 10 pounds?not   No    Compared with most (men/women) your age, would you say  that you are more active, less active, or about the same? less   Yes    Timed Up and Go: 14 seconds with cane assist  FALL RISK ASSESSMENT 6/7/2019 8/27/2018 8/14/2017   Fallen 2 or more times in the past year? No Yes Yes   Any fall with injury in the past year? No No Yes   Timed Up and Go Test/Seconds (13.5 is a fall risk; contact physician) - 32 37.83         Advance Directives:   Discussed with patient and family as appropriate. Has patient  completed advance directives and/or a living will? no  Provided blank advance directive and patient would like to look at with entire family. Advised Cris language not available, would be happy to help translate at next appointment with .     Angela Hutson RN

## 2019-06-07 NOTE — PATIENT INSTRUCTIONS
PERSONAL PREVENTIVE SERVICES PLAN - SERVICES     Review these tests with your medical staff then decide which ones you want and take this page home for your reference      SCREENING TESTS     Description   Year of Last Screening   Recommended Today?   Heart disease screening blood tests    Cholesterol level Reducing cholesterol can reduce your risk of heart attacks by 25%.  Screening is recommended yearly if you are at risk of heart disease otherwise every 4-5 years 04/2015 Yes; Recommended    Diabetes screening tests    Hemoglobin A1c blood test   Finding and treating diabetes early can reduce complications.  Screening recommended/covered yearly if you have high blood pressure, high cholesterol, obesity (BMI >30), or a history of high blood glucose tests; or 2 of the following: family history of diabetes, overweight (BMI >25 but <30), age 65 years or older, and a history of diabetes of pregnancy or gave birth to baby weighing more than 9 lbs. BMP 05/01/19 No; is up to date.   Hepatitis B screening Finding hepatitis B early can reduce complications.  Screening is recommended for persons with selected risk factors. - No: is not indicated today.   Hepatitis C screening Finding hepatitis C early can reduce complications.  Screening is recommended for all persons born from 1945 through 1965 and for those with selected other risk factors.  - No: is not indicated today.   HIV screening Finding HIV early can reduce complications.  Screening is recommended for persons with risk factors for HIV infection. - No: is not indicated today.   Glaucoma screening Early detection of glaucoma can prevent blindness.   Please talk to your eye doctor about this.       SCREENING TESTS     Description   Year of Last Screening   Recommended Today?   Colorectal cancer screening    Fecal occult blood test     Screening colonoscopy Screening for colon cancer has been shown to reduce death from colon cancer by 25-30%. Screening recommended to  start at 50 years and continuing until age 75 years.   - No: is not indicated today.   Breast Cancer Screening (women)    Mammogram Mammogram screening for breast cancer has been shown to reduce the risk of dying from breast cancer and prolong life. Screening is recommended every 1-2 years for women aged 50 to 74 years.  - No: is not indicated today.   Cervical Cancer screening (women)    Pap Cervical pap smears can reduce cervical cancer. Screening is recommended annually if high risk (history of abnormal pap smears) otherwise every 2-3 years, stop screening at 65 years of age if history of normal paps. - No: is not indicated today.   Screening for Osteoporosis:  Bone mass measurements (women)    Dexa Scan Screening and treating Osteoporosis can reduce the risk of hip and spine fractures. Screening is recommended in women 65 years or older and in women and men at risk of osteoporosis. - Yes; Recommended    Screening for Lung Cancer     Low-dose CT scanning Screening can reduce mortality in persons aged 55-80 who have smoked at least 30 pack-years and who are either still smoking or have quit in the past 15 years. - No: is not indicated today.   Abdominal Aortic Aneurysm (AAA) screening    Ultrasound (US)   An aneurysm treated before rupture is very safe -a ruptured aneurysm can be fatal.  Screening  by US for AAA is limited to patients who meet one of the following criteria:    Men who are 65-75 years old and have smoked more than 100 cigarettes in their lifetime    Anyone with a family history of abdominal aortic aneurysm - No: is not indicated today.     Here are your recommended immunizations.  Take this home for your reference.                                                    IMMUNIZATIONS Description Recommend today?     Influenza (Flu shot) Prevents flu; should get every year No; is up to date.   PCV 13 Pneumonia vaccination; you get it once No; is up to date.   PPSV 23 Second pneumonia vaccination; usually  get it 1 year after PCV 13 No; is up to date.   Zoster (Shingles) Prevents shingles; you get it once  (Check with Part D insurance for coverage, must receive at a pharmacy, not clinic) Yes; Recommended    Tetanus Prevents tetanus; once every 10 years No; is up to date.     Hepatitis B  If you have any of the following risk factors you should be immunized for hepatitis B: severe kidney disease, people who live in the same house as a carrier of Hepatitis B virus, people who live in  institutions (e.g. nursing homes or group homes), homosexual men, patients with hemophilia who received Factor VIII or IX concentrates, abusers of illicit injectable drugs No: is not indicated today.      PATIENT INSTRUCTIONS    Yearly exam:     See your health care provider every year in order to review changes in your health, review medicines that you take, and discuss preventive care needs such as immunizations and cancer screening.    Get a flu shot each year.     Advance Directives:    If you have not done so, you are encouraged to complete advance directives and/or a living will.   More information about advance directives can be found at: http://www.[place]med.org/advocacy/Key-Issues/Advance-Directives    Nutrition:     Eat at least 5 servings of fruits and vegetables each day.     Eat whole-grain bread, whole-wheat pasta and brown rice instead of white grains and rice.     Talk to your doctor about Calcium and Vitamin D.     Lifestyle:    Exercise for at least 150 minutes a week (30 minutes a day, 5 days a week). This will help you control your weight and prevent disease.     Limit alcohol to one drink per day.     If you smoke, try to quit - your doctor will be happy to help.     Wear sunscreen to prevent skin cancer.     See your dentist every six months for an exam and cleaning.     See your eye doctor every 1 to 2 years to screen for conditions such as glaucoma, macular degeneration and cataracts.        Referral for ( TEST )  :       Dexa Scan   LOCATION/PLACE/Provider :    [facility]  DATE & TIME :     6- at 2:30  PHONE :     [phone]  FAX :     [phone]  Appointment made by clinic staff/:    Harriet                        Personalized Prevention Plan  You are due for the preventive services outlined below.  Your care team is available to assist you in scheduling these services.  If you have already completed any of these items, please share that information with your care team to update in your medical record.  Health Maintenance Due   Topic Date Due     Osteoporosis Screening  1935     Discuss Advance Directive Planning  1935     Annual Wellness Visit  01/01/2000     Zoster (Shingles) Vaccine (2 of 3) 09/10/2013

## 2019-06-10 NOTE — NURSING NOTE
Due to patient being non-English speaking/uses sign language, an  was used for this visit. Only for face-to-face interpretation by an external agency, date and length of interpretation can be found on the scanned worksheet.     name: Eamon Rodriguez  Agency: [employer]  Language: Cris  Telephone number: [phone]  Type of interpretation: Face-to-face, spoken     Abena Carpenter CMA

## 2019-06-11 NOTE — PROGRESS NOTES
I have personally reviewed the history and examination as documented by Dr. Lockwood.  I was present during key portions of the visit and agree with the assessment and plan as documented for 84 yr old female with HTN, chronic Hep B,frailty here for wellness visit.  All elements of wellness eval completed. Will check dexa and get walker for pt given frailty concerns. Advanced directives and code status reviewed as well. Precautions given. Anticipatory guidance given.     Amilcar Avina MD  June 11, 2019  2:14 PM

## 2019-06-17 DIAGNOSIS — M81.0 AGE-RELATED OSTEOPOROSIS WITHOUT CURRENT PATHOLOGICAL FRACTURE: Primary | ICD-10-CM

## 2019-06-17 RX ORDER — ALENDRONATE SODIUM 70 MG/1
70 TABLET ORAL
Qty: 16 TABLET | Refills: 1 | Status: SHIPPED | OUTPATIENT
Start: 2019-06-17 | End: 2022-07-13

## 2019-09-19 DIAGNOSIS — I10 BENIGN ESSENTIAL HYPERTENSION: ICD-10-CM

## 2019-09-19 RX ORDER — AMLODIPINE BESYLATE 10 MG/1
10 TABLET ORAL DAILY
Qty: 90 TABLET | Refills: 3 | Status: SHIPPED | OUTPATIENT
Start: 2019-09-19 | End: 2020-06-29

## 2019-09-26 NOTE — ADDENDUM NOTE
Addended by: HOLDEN ARELLANO on: 8/20/2018 04:36 PM     Modules accepted: Orders     Additional Notes: Pt states Hx of melanoma excised last year but is unsure of where this was treated and if it was actually a melanoma. She will sign DEANDRE today after checking with daughter on previous derm. Detail Level: Simple

## 2019-12-12 ENCOUNTER — OFFICE VISIT (OUTPATIENT)
Dept: FAMILY MEDICINE | Facility: CLINIC | Age: 84
End: 2019-12-12
Payer: COMMERCIAL

## 2019-12-12 VITALS
SYSTOLIC BLOOD PRESSURE: 129 MMHG | TEMPERATURE: 98.2 F | BODY MASS INDEX: 22.09 KG/M2 | HEART RATE: 62 BPM | DIASTOLIC BLOOD PRESSURE: 72 MMHG | OXYGEN SATURATION: 93 % | WEIGHT: 102.4 LBS | RESPIRATION RATE: 24 BRPM | HEIGHT: 57 IN

## 2019-12-12 DIAGNOSIS — F32.2 CURRENT SEVERE EPISODE OF MAJOR DEPRESSIVE DISORDER WITHOUT PSYCHOTIC FEATURES WITHOUT PRIOR EPISODE (H): ICD-10-CM

## 2019-12-12 DIAGNOSIS — R41.3 MEMORY IMPAIRMENT: ICD-10-CM

## 2019-12-12 DIAGNOSIS — F43.10 PTSD (POST-TRAUMATIC STRESS DISORDER): ICD-10-CM

## 2019-12-12 DIAGNOSIS — Z23 NEED FOR PROPHYLACTIC VACCINATION AND INOCULATION AGAINST INFLUENZA: Primary | ICD-10-CM

## 2019-12-12 ASSESSMENT — MIFFLIN-ST. JEOR: SCORE: 789.74

## 2019-12-12 NOTE — PROGRESS NOTES
Preceptor Attestation:   Patient seen, evaluated and discussed with the resident. I have verified the content of the note, which accurately reflects my assessment of the patient and the plan of care.  Supervising Physician:Lashae Mehta MD  [facility]

## 2019-12-12 NOTE — LETTER
Written disability evaluation    12/12/2019    Re: Ranjeet Toure  1935      To Whom It May Concern:     Ranjeet Toure was seen in clinic today, 12/12/19. Reviewed notes from psychologist, since we have not formally been treating patient for mental health conditions. Per their records, she has been diagnosed with post traumatic stress disorder and chronic and major depressive disorder, single episode, severe without psychotic features, based on DSM-V criteria. On my evaluation today, patient unable to answer simple orientation questions, and is unable to answer any questions for memory screening. I have concerns for her memory and do not feel that she would be able to learn the English, let alone the oath, because of her memory difficulties. It impossible that she will ever be able to learn the English language.          Jani George MD  12/12/2019 9:49 AM
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
## 2019-12-12 NOTE — NURSING NOTE
Due to patient being non-English speaking/uses sign language, an  was used for this visit. Only for face-to-face interpretation by an external agency, date and length of interpretation can be found on the scanned worksheet.     name: elvia ade  Agency: [employer]   Language: Cris   Telephone number:   Type of interpretation: Face-to-face, spoken

## 2020-04-01 DIAGNOSIS — E87.6 HYPOKALEMIA: ICD-10-CM

## 2020-04-01 RX ORDER — POTASSIUM CHLORIDE 1500 MG/1
20 TABLET, EXTENDED RELEASE ORAL 2 TIMES DAILY
Qty: 180 TABLET | Refills: 1 | Status: SHIPPED | OUTPATIENT
Start: 2020-04-01 | End: 2020-06-02

## 2020-05-27 ENCOUNTER — TELEPHONE (OUTPATIENT)
Dept: FAMILY MEDICINE | Facility: CLINIC | Age: 85
End: 2020-05-27

## 2020-05-27 NOTE — TELEPHONE ENCOUNTER
[facility] phone call message- general phone call:    Reason for call: Caller states she would like to speak with a case or . She states patient is on the last step of his application process to be naturalized. Caller states she would like to discuss N648 medical waiver appt and she will be faxing a ANTONIA to discuss patient information. Please call and advise.    Return call needed: Yes    OK to leave a message on voice mail? Yes    Primary language: Cris      needed? Yes    Call taken on May 27, 2020 at 10:21 AM by Concetta Camarillo

## 2020-06-01 DIAGNOSIS — E87.6 HYPOKALEMIA: ICD-10-CM

## 2020-06-01 NOTE — TELEPHONE ENCOUNTER
[facility] phone call message- patient requesting a refill:    Full Medication Name: potassium chloride ER (KLOR-CON)     Dose: 20 MEQ CR tablet     Pharmacy confirmed as  [facility] [place] [id number] PH: [phone] : Yes    Additional Comments: Caller states patient needs a refill for medication listed above. Please call and advise.     OK to leave a message on voice mail? Yes    Primary language: Cris      needed? Yes    Call taken on June 1, 2020 at 1:52 PM by Concetta Camarillo

## 2020-06-02 RX ORDER — POTASSIUM CHLORIDE 1500 MG/1
20 TABLET, EXTENDED RELEASE ORAL 2 TIMES DAILY
Qty: 180 TABLET | Refills: 1 | Status: SHIPPED | OUTPATIENT
Start: 2020-06-02 | End: 2020-06-29

## 2020-06-18 NOTE — PROGRESS NOTES
"       HPI:       Ranjeet Toure is a 85 year old female with a significant past medical history of hearing loss, PTSD, severe major depressive disorder. Ranjeet Toure was last seen on 12/12/19.    Chief Complaint   Patient presents with     Forms     medical waiver form citizenship (failed first time) didnt understand it, due to patient non responsiveness during interview      Medication Reconciliation     not completed        Today Ranjeet Toure requested assistance with completion of the N648 form to medically certify her for disability exceptions to the standard English and/or civics requirements due to a physical or developmental disability or mental impairment that has lasted or is expected to last, 12 months or more.  she states that the cause of her disability is memory problems.  As part of our assessment for this concern today, the following questions were explored:      1. Do you have a  or another type advocate helping you with your citizenship process? Yes  -If yes, who? They think the name is \"Catalina\". This person called by phone, they have no clue how to get a hold of her. This was in December.     -Have you already applied to take/retake the citizenship exam? She has taken the exam once and failed in 2018. She is not scheduled to take the test again.   -If yes, when are you scheduled to take the exam? She is not    2. If yes to any of the following questions, may be best to refer patient out for assessment by community provider who specializes in N648 assessment process:  -Have you ever tried to take the citizenship exam and failed? YES, 2018  -Have you submitted an N648 waiver in the past and it was denied?No  -Have you ever been employed? No  -Have you ever obtained a  s license or permit? No  -Have you ever successfully completed training for any type of certification? No  -Are you able to understand and/or speak even limited English? No  -Are you under 45 years of age: No  -Is the medical/behavioral health " "provider being asked to complete the form unlicensed: YES    3. Other questions to aid in assessment process:  -Have you had any opportunity for school in your country of origin?  If so, how much?  No  -Have you ever taken a citizenship or English class? No  -Can you give me any examples of how your disability impacts your daily function? Unable to remember enough to learn English  -Are there family members or friends who could provide information on your disability/daily function? Yes, \"sister in law\" is present toda      A Cris  was used for this visit.         Review of Systems:     Unable to obtain due to patient's baseline mental status            PMHX:     Patient Active Problem List   Diagnosis     Benign essential hypertension     Chronic hepatitis B virus infection (H)     Tear film insufficiency     Hearing loss     Osteoarthrosis involving lower leg     Chest pain, unspecified type     Hypokalemia     DNR (do not resuscitate)     Frailty     Age-related osteoporosis without current pathological fracture     Memory impairment     PTSD (post-traumatic stress disorder)     Current severe episode of major depressive disorder without psychotic features without prior episode (H)       Current Outpatient Medications   Medication Sig Dispense Refill     acetaminophen (TYLENOL) 500 MG tablet Take 2 tablets (1,000 mg) by mouth 2 times daily as needed for mild pain 100 tablet 3     albuterol (PROAIR HFA/PROVENTIL HFA/VENTOLIN HFA) 108 (90 Base) MCG/ACT inhaler Inhale 2 puffs into the lungs every 6 hours 8.5 g 0     alendronate (FOSAMAX) 70 MG tablet Take 1 tablet (70 mg) by mouth every 7 days 16 tablet 1     amLODIPine (NORVASC) 10 MG tablet Take 1 tablet (10 mg) by mouth daily 90 tablet 3     hypromellose-dextran 0.3-0.1% (ARTIFICIAL TEARS) opthalmic solution Apply 1-2 drops to eye 4 times daily as needed       order for DME Equipment being ordered: 4 wheeled walker with seat. 1 Units 0     potassium " chloride ER (KLOR-CON) 20 MEQ CR tablet Take 1 tablet (20 mEq) by mouth 2 times daily 180 tablet 1     Spacer/Aero-Holding Chambers (AEROCHAMBER Z-STAT PLUS CHAMBR) MISC To use with HFA inhaler as directed. 1 each 0              Allergies   Allergen Reactions     No Known Allergies        No results found for this or any previous visit (from the past 24 hour(s)).          Past Surgical HX:     No past surgical history on file.         Family HX:     Family History   Problem Relation Age of Onset     Diabetes No family hx of      Coronary Artery Disease No family hx of      Hypertension No family hx of      Hyperlipidemia No family hx of      Breast Cancer No family hx of      Cancer - colorectal No family hx of      Ovarian Cancer No family hx of      Prostate Cancer No family hx of      Other Cancer No family hx of      Mental Illness No family hx of      Cerebrovascular Disease No family hx of      Anesthesia Reaction No family hx of      Asthma No family hx of      Osteoporosis No family hx of      Known Genetic Syndrome No family hx of      Obesity No family hx of      Unknown/Adopted No family hx of      Colon Cancer No family hx of      Depression No family hx of      Anxiety Disorder No family hx of      Mental Illness No family hx of      Substance Abuse No family hx of      Genetic Disorder No family hx of      Thyroid Disease No family hx of           Social HX:     Social History     Socioeconomic History     Marital status: Single     Spouse name: Not on file     Number of children: Not on file     Years of education: Not on file     Highest education level: Not on file   Occupational History     Not on file   Social Needs     Financial resource strain: Not on file     Food insecurity     Worry: Not on file     Inability: Not on file     Transportation needs     Medical: Not on file     Non-medical: Not on file   Tobacco Use     Smoking status: Never Smoker     Smokeless tobacco: Never Used   Substance  "and Sexual Activity     Alcohol use: No     Alcohol/week: 0.0 standard drinks     Drug use: No     Sexual activity: Not on file   Lifestyle     Physical activity     Days per week: Not on file     Minutes per session: Not on file     Stress: Not on file   Relationships     Social connections     Talks on phone: Not on file     Gets together: Not on file     Attends Taoist service: Not on file     Active member of club or organization: Not on file     Attends meetings of clubs or organizations: Not on file     Relationship status: Not on file     Intimate partner violence     Fear of current or ex partner: Not on file     Emotionally abused: Not on file     Physically abused: Not on file     Forced sexual activity: Not on file   Other Topics Concern     Parent/sibling w/ CABG, MI or angioplasty before 65F 55M? Not Asked   Social History Narrative     Not on file              Physical Exam:     Vitals:    06/19/20 1439   BP: 125/71   Pulse: 85   Resp: 16   Temp: 98.8  F (37.1  C)   TempSrc: Oral   SpO2: 96%   Weight: 44.5 kg (98 lb)   Height: 1.41 m (4' 7.51\")     Body mass index is 22.36 kg/m .    GENERAL APPEARANCE: Elderly, frail female, NAD  MS: extremities normal- no gross deformities noted  SKIN: no suspicious lesions or rashes  NEURO: Unable to comply for neuro examination  PSYCH: mood and affect normal/bright    MOCA score 1/30 today  Assessment and Plan     ASSESSMENT  1. Memory impairment  MOCA score of 1/30 today. Extensive amount of time taken to review previous notes, review patients current neurological status, and to review the N648 process with patient and sister in law. I think we can proceed with N648 at next visit given information I gained through today's visit. Will obtain labs at next visit to confirm dementia not due to any reversible cause.   - TSH  Sensitive ([facility]); Future  - Vitamin B12 ([facility]); Future  - CBC with Diff Plt ([facility]); Future  Comment: As part of our assessment for " today's discussion of the N648 process, the above questions were explored.      PLAN:  Should check for any reversible causes of dementia at next visit with TSH, CBC, B12.     -I am familiar with this patient s condition and agree that they are unable to meet the English and/or civics requirements due to the presence of a disability and I am willing and able to complete the N648 form. I will discuss with Dr. Harriet Mcgrath, have patient and sister in law come in on 6/29, and complete the process at that visit.       Jani George MD    Staffed with Dr. Prado

## 2020-06-19 ENCOUNTER — OFFICE VISIT (OUTPATIENT)
Dept: FAMILY MEDICINE | Facility: CLINIC | Age: 85
End: 2020-06-19
Payer: COMMERCIAL

## 2020-06-19 VITALS
WEIGHT: 98 LBS | SYSTOLIC BLOOD PRESSURE: 125 MMHG | HEIGHT: 56 IN | OXYGEN SATURATION: 96 % | HEART RATE: 85 BPM | BODY MASS INDEX: 22.04 KG/M2 | DIASTOLIC BLOOD PRESSURE: 71 MMHG | TEMPERATURE: 98.8 F | RESPIRATION RATE: 16 BRPM

## 2020-06-19 DIAGNOSIS — R41.3 MEMORY IMPAIRMENT: Primary | ICD-10-CM

## 2020-06-19 ASSESSMENT — MIFFLIN-ST. JEOR: SCORE: 739.78

## 2020-06-19 NOTE — PATIENT INSTRUCTIONS
[facility] Legal Services  Immigration and Education Services - [address], [address]  [place] [address]  [phone] FAX  [email or web address]    Languages: Mauritanian, Czech, Hmong, Bulgarian, and Greek  Provide interpreters for other languages.

## 2020-06-24 ENCOUNTER — TELEPHONE (OUTPATIENT)
Dept: FAMILY MEDICINE | Facility: CLINIC | Age: 85
End: 2020-06-24

## 2020-06-24 NOTE — TELEPHONE ENCOUNTER
----- Message from Jani George MD sent at 6/24/2020 12:45 PM CDT -----  Hi team!    Could someone call and just remind the family to bring the patient's residence card # or to give the card number over the phone? I will need it to fill out the N-648 form for the patient. Thank you!!!!!!    Jadiel

## 2020-06-24 NOTE — TELEPHONE ENCOUNTER
Called. Pt's care taker will bring a copy of residence card # in clinic today or tomorrow.     Due to patient being non-English speaking/uses sign language, an  was used for this visit. Only for face-to-face interpretation by an external agency, date and length of interpretation can be found on the scanned worksheet.     name: Cailin [id number]  Agency: [employer] - telephone  Language: Cris   Telephone number: [phone]  Type of interpretation: Telephone, spoken

## 2020-06-29 ENCOUNTER — OFFICE VISIT (OUTPATIENT)
Dept: FAMILY MEDICINE | Facility: CLINIC | Age: 85
End: 2020-06-29
Payer: COMMERCIAL

## 2020-06-29 VITALS
BODY MASS INDEX: 22.45 KG/M2 | TEMPERATURE: 98.4 F | DIASTOLIC BLOOD PRESSURE: 70 MMHG | OXYGEN SATURATION: 98 % | HEIGHT: 56 IN | SYSTOLIC BLOOD PRESSURE: 126 MMHG | HEART RATE: 86 BPM | WEIGHT: 99.8 LBS | RESPIRATION RATE: 22 BRPM

## 2020-06-29 DIAGNOSIS — I10 BENIGN ESSENTIAL HYPERTENSION: ICD-10-CM

## 2020-06-29 DIAGNOSIS — E87.6 HYPOKALEMIA: ICD-10-CM

## 2020-06-29 DIAGNOSIS — R41.3 MEMORY IMPAIRMENT: Primary | ICD-10-CM

## 2020-06-29 LAB
% GRANULOCYTES: 66.3 %G (ref 40–75)
ANION GAP SERPL CALCULATED.3IONS-SCNC: 11 MMOL/L (ref 5–18)
BUN SERPL-MCNC: 22 MG/DL (ref 8–28)
CALCIUM SERPL-MCNC: 9.7 MG/DL (ref 8.5–10.5)
CHLORIDE SERPL-SCNC: 107 MMOL/L (ref 98–107)
CO2 SERPL-SCNC: 20 MMOL/L (ref 22–31)
CREAT SERPL-MCNC: 1.03 MG/DL (ref 0.6–1.1)
GLUCOSE SERPL-MCNC: 133 MG/DL (ref 70–125)
GRANULOCYTES #: 3.4 K/UL (ref 1.6–8.3)
HCT VFR BLD AUTO: 38.2 % (ref 35–47)
HEMOGLOBIN: 11.5 G/DL (ref 11.7–15.7)
LYMPHOCYTES # BLD AUTO: 1.4 K/UL (ref 0.8–5.3)
LYMPHOCYTES NFR BLD AUTO: 27.6 %L (ref 20–48)
MCH RBC QN AUTO: 29.1 PG (ref 26.5–35)
MCHC RBC AUTO-ENTMCNC: 30.1 G/DL (ref 32–36)
MCV RBC AUTO: 96.6 FL (ref 78–100)
MID #: 0.3 K/UL (ref 0–2.2)
MID %: 6.1 %M (ref 0–20)
PLATELET # BLD AUTO: 256 K/UL (ref 150–450)
POTASSIUM SERPL-SCNC: 4.2 MMOL/L (ref 3.5–5)
RBC # BLD AUTO: 3.95 M/UL (ref 3.8–5.2)
SODIUM SERPL-SCNC: 138 MMOL/L (ref 136–145)
TSH SERPL DL<=0.05 MIU/L-ACNC: 1.19 UIU/ML (ref 0.3–5)
VIT B12 SERPL-MCNC: 498 PG/ML (ref 213–816)
WBC # BLD AUTO: 5.2 K/UL (ref 4–11)

## 2020-06-29 RX ORDER — POTASSIUM CHLORIDE 1500 MG/1
20 TABLET, EXTENDED RELEASE ORAL 2 TIMES DAILY
Qty: 180 TABLET | Refills: 3 | Status: SHIPPED | OUTPATIENT
Start: 2020-06-29 | End: 2021-08-16

## 2020-06-29 RX ORDER — AMLODIPINE BESYLATE 10 MG/1
10 TABLET ORAL DAILY
Qty: 90 TABLET | Refills: 3 | Status: SHIPPED | OUTPATIENT
Start: 2020-06-29 | End: 2021-07-20

## 2020-06-29 ASSESSMENT — MIFFLIN-ST. JEOR: SCORE: 754.2

## 2020-06-29 NOTE — NURSING NOTE
Due to patient being non-English speaking/uses sign language, an  was used for this visit. Only for face-to-face interpretation by an external agency, date and length of interpretation can be found on the scanned worksheet.     name: [email or web address]  Agency: Peggy [employer]bar[employer]  Language: Cris   Telephone number: [phone]  Type of interpretation: Telephone, spoken

## 2020-06-29 NOTE — PROGRESS NOTES
"       HPI:       Ranjeet Toure is a 85 year old  female who presents for follow up of concern(s) listed below    N-648 waiver form:  -From last visit \"MOCA score of 1/30 today. Extensive amount of time taken to review previous notes, review patients current neurological status, and to review the N648 process with patient and sister in law. I think we can proceed with N648 at next visit given information I gained through today's visit. Will obtain labs at next visit to confirm dementia not due to any reversible cause.   - T[facility]  Sensitive ([facility]); Future  - Vitamin B12 ([facility]); Future  - CBC with Diff Plt ([facility]); Future  Comment: As part of our assessment for today's discussion of the N648 process, the above questions were explored.\"    Asking for refill on amlodipine and potassium.     A Cris  was used for this visit.         PMHX:     Patient Active Problem List   Diagnosis     Benign essential hypertension     Chronic hepatitis B virus infection (H)     Tear film insufficiency     Hearing loss     Osteoarthrosis involving lower leg     Chest pain, unspecified type     Hypokalemia     DNR (do not resuscitate)     Frailty     Age-related osteoporosis without current pathological fracture     Memory impairment     PTSD (post-traumatic stress disorder)     Current severe episode of major depressive disorder without psychotic features without prior episode (H)       Current Outpatient Medications   Medication Sig Dispense Refill     amLODIPine (NORVASC) 10 MG tablet Take 1 tablet (10 mg) by mouth daily 90 tablet 3     potassium chloride ER (KLOR-CON) 20 MEQ CR tablet Take 1 tablet (20 mEq) by mouth 2 times daily 180 tablet 3     acetaminophen (TYLENOL) 500 MG tablet Take 2 tablets (1,000 mg) by mouth 2 times daily as needed for mild pain 100 tablet 3     albuterol (PROAIR HFA/PROVENTIL HFA/VENTOLIN HFA) 108 (90 Base) MCG/ACT inhaler Inhale 2 puffs into the lungs every 6 hours 8.5 g 0     alendronate " (FOSAMAX) 70 MG tablet Take 1 tablet (70 mg) by mouth every 7 days 16 tablet 1     hypromellose-dextran 0.3-0.1% (ARTIFICIAL TEARS) opthalmic solution Apply 1-2 drops to eye 4 times daily as needed       order for DME Equipment being ordered: 4 wheeled walker with seat. 1 Units 0     Spacer/Aero-Holding Chambers (AEROCHAMBER Z-STAT PLUS CHAMBR) MISC To use with HFA inhaler as directed. 1 each 0       Social History     Socioeconomic History     Marital status: Single     Spouse name: Not on file     Number of children: Not on file     Years of education: Not on file     Highest education level: Not on file   Occupational History     Not on file   Social Needs     Financial resource strain: Not on file     Food insecurity     Worry: Not on file     Inability: Not on file     Transportation needs     Medical: Not on file     Non-medical: Not on file   Tobacco Use     Smoking status: Never Smoker     Smokeless tobacco: Never Used   Substance and Sexual Activity     Alcohol use: No     Alcohol/week: 0.0 standard drinks     Drug use: No     Sexual activity: Not on file   Lifestyle     Physical activity     Days per week: Not on file     Minutes per session: Not on file     Stress: Not on file   Relationships     Social connections     Talks on phone: Not on file     Gets together: Not on file     Attends [employer] service: Not on file     Active member of club or organization: Not on file     Attends meetings of clubs or organizations: Not on file     Relationship status: Not on file     Intimate partner violence     Fear of current or ex partner: Not on file     Emotionally abused: Not on file     Physically abused: Not on file     Forced sexual activity: Not on file   Other Topics Concern     Parent/sibling w/ CABG, MI or angioplasty before 65F 55M? Not Asked   Social History Narrative     Not on file          Allergies   Allergen Reactions     No Known Allergies        Results for orders placed or performed in visit  "on 06/29/20 (from the past 24 hour(s))   CBC with Diff Plt ([facility])   Result Value Ref Range    WBC 5.2 4.0 - 11.0 K/uL    Lymphocytes # 1.4 0.8 - 5.3 K/uL    % Lymphocytes 27.6 20.0 - 48.0 %L    Mid # 0.3 0.0 - 2.2 K/uL    Mid % 6.1 0.0 - 20.0 %M    GRANULOCYTES # 3.4 1.6 - 8.3 K/uL    % Granulocytes 66.3 40.0 - 75.0 %G    RBC 3.95 3.80 - 5.20 M/uL    Hemoglobin 11.5 (L) 11.7 - 15.7 g/dL    Hematocrit 38.2 35.0 - 47.0 %    MCV 96.6 78.0 - 100.0 fL    MCH 29.1 26.5 - 35.0 pg    MCHC 30.1 (L) 32.0 - 36.0 g/dL    Platelets 256.0 150.0 - 450.0 K/uL            Review of Systems:     A 10 point review of systems including constitutional, cardiovascular, respiratory, HEENT, GI, , neurological, MSK, skin, endocrine, is negative unless stated in HPI          Physical Exam:     Vitals:    06/29/20 1124   BP: 126/70   Pulse: 86   Resp: 22   Temp: 98.4  F (36.9  C)   SpO2: 98%   Weight: 45.3 kg (99 lb 12.8 oz)   Height: 1.42 m (4' 7.91\")     Body mass index is 22.45 kg/m .    GENERAL APPEARANCE: Elderly, frail female, NAD  MS: extremities normal- no gross deformities noted  SKIN: no suspicious lesions or rashes  NEURO: Unable to comply for neuro examination  PSYCH: mood and affect normal/bright      Assessment and Plan     1. Memory impairment  N-648 form filled out. Just waiting on labs to confirm no organic cause that's reversible. If all looks good, family can come  form tomorrow.   - CBC with Diff Plt ([facility])  - Vitamin B12 ([facility])  - TSH  Sensitive ([facility])    2. Hypokalemia  Should recheck K. Will see if I can add on a BMP today.   - potassium chloride ER (KLOR-CON) 20 MEQ CR tablet; Take 1 tablet (20 mEq) by mouth 2 times daily  Dispense: 180 tablet; Refill: 3    3. Benign essential hypertension  \"Door-handle\" refill ask. BP good today.   - amLODIPine (NORVASC) 10 MG tablet; Take 1 tablet (10 mg) by mouth daily  Dispense: 90 tablet; Refill: 3      Options for treatment and follow-up care were reviewed " with the patient and/or guardian. Ku Paw and/or guardian engaged in the decision making process and verbalized understanding of the options discussed and agreed with the final plan.    Jani George MD  [facility] Resident, PGY2      Precepted today with: Mirtha Neves MD

## 2020-06-30 NOTE — PROGRESS NOTES
Preceptor Attestation:  Patient seen and discussed with the resident..  I agree with written assessment and plan of care.  Supervising Physician:  Angie Neves MD  [facility]

## 2020-07-09 ENCOUNTER — TELEPHONE (OUTPATIENT)
Dept: FAMILY MEDICINE | Facility: CLINIC | Age: 85
End: 2020-07-09

## 2020-07-09 NOTE — TELEPHONE ENCOUNTER
[facility] phone call message- general phone call:    Reason for call: Caller states she would like to speak with Dr. MILTON George. She states she would like to speak in regards to the patients ability to take a naturalization oath. She states she did receive the medical waiver. Please call and advise.      Return call needed: Yes    OK to leave a message on voice mail? Yes    Primary language: Cris      needed? Yes    Call taken on July 9, 2020 at 10:34 AM by Concetta Camarillo

## 2021-02-17 ENCOUNTER — TELEPHONE (OUTPATIENT)
Dept: FAMILY MEDICINE | Facility: CLINIC | Age: 86
End: 2021-02-17

## 2021-02-17 NOTE — TELEPHONE ENCOUNTER
An  was used for this call. Called patient and spoke with family member, advised of opportunity to receive COVID Vaccine. Patient declined, requested call-back if patient requests vaccine at a different time. Chapo TAYLOR

## 2021-07-20 DIAGNOSIS — I10 BENIGN ESSENTIAL HYPERTENSION: ICD-10-CM

## 2021-07-20 RX ORDER — AMLODIPINE BESYLATE 10 MG/1
10 TABLET ORAL DAILY
Qty: 90 TABLET | Refills: 3 | Status: SHIPPED | OUTPATIENT
Start: 2021-07-20 | End: 2022-07-13

## 2021-08-16 DIAGNOSIS — E87.6 HYPOKALEMIA: ICD-10-CM

## 2021-08-16 RX ORDER — POTASSIUM CHLORIDE 1500 MG/1
20 TABLET, EXTENDED RELEASE ORAL 2 TIMES DAILY
Qty: 60 TABLET | Refills: 0 | Status: SHIPPED | OUTPATIENT
Start: 2021-08-16 | End: 2021-11-05

## 2021-10-01 PROBLEM — M17.10 UNILATERAL PRIMARY OSTEOARTHRITIS, UNSPECIFIED KNEE: Status: ACTIVE | Noted: 2017-07-20

## 2021-11-03 DIAGNOSIS — E87.6 HYPOKALEMIA: ICD-10-CM

## 2021-11-05 RX ORDER — POTASSIUM CHLORIDE 1500 MG/1
20 TABLET, EXTENDED RELEASE ORAL 2 TIMES DAILY
Qty: 60 TABLET | Refills: 0 | Status: SHIPPED | OUTPATIENT
Start: 2021-11-05 | End: 2022-01-07

## 2021-12-08 ENCOUNTER — OFFICE VISIT (OUTPATIENT)
Dept: FAMILY MEDICINE | Facility: CLINIC | Age: 86
End: 2021-12-08
Payer: COMMERCIAL

## 2021-12-08 VITALS
TEMPERATURE: 98.2 F | BODY MASS INDEX: 22.49 KG/M2 | SYSTOLIC BLOOD PRESSURE: 116 MMHG | WEIGHT: 100 LBS | OXYGEN SATURATION: 100 % | HEART RATE: 74 BPM | DIASTOLIC BLOOD PRESSURE: 66 MMHG

## 2021-12-08 DIAGNOSIS — B18.1 CHRONIC HEPATITIS B VIRUS INFECTION (H): ICD-10-CM

## 2021-12-08 DIAGNOSIS — M54.50 CHRONIC MIDLINE LOW BACK PAIN WITHOUT SCIATICA: Primary | ICD-10-CM

## 2021-12-08 DIAGNOSIS — G89.29 CHRONIC MIDLINE LOW BACK PAIN WITHOUT SCIATICA: Primary | ICD-10-CM

## 2021-12-08 DIAGNOSIS — F32.2 CURRENT SEVERE EPISODE OF MAJOR DEPRESSIVE DISORDER WITHOUT PSYCHOTIC FEATURES WITHOUT PRIOR EPISODE (H): ICD-10-CM

## 2021-12-08 DIAGNOSIS — Z23 NEED FOR PROPHYLACTIC VACCINATION AND INOCULATION AGAINST INFLUENZA: ICD-10-CM

## 2021-12-08 PROBLEM — N18.9 CHRONIC RENAL FAILURE: Status: ACTIVE | Noted: 2021-12-08

## 2021-12-08 PROBLEM — T75.3XXA MOTION SICKNESS: Status: ACTIVE | Noted: 2021-12-08

## 2021-12-08 PROBLEM — M25.519 PAIN IN JOINT, SHOULDER REGION: Status: ACTIVE | Noted: 2021-12-08

## 2021-12-08 PROBLEM — H26.9 CATARACT: Status: ACTIVE | Noted: 2021-12-08

## 2021-12-08 PROBLEM — M54.2 NECK PAIN: Status: ACTIVE | Noted: 2021-12-08

## 2021-12-08 PROBLEM — H53.8 BLURRY VISION: Status: ACTIVE | Noted: 2021-12-08

## 2021-12-08 PROCEDURE — 99213 OFFICE O/P EST LOW 20 MIN: CPT | Mod: 25 | Performed by: STUDENT IN AN ORGANIZED HEALTH CARE EDUCATION/TRAINING PROGRAM

## 2021-12-08 PROCEDURE — 90662 IIV NO PRSV INCREASED AG IM: CPT | Performed by: STUDENT IN AN ORGANIZED HEALTH CARE EDUCATION/TRAINING PROGRAM

## 2021-12-08 PROCEDURE — 90471 IMMUNIZATION ADMIN: CPT | Performed by: STUDENT IN AN ORGANIZED HEALTH CARE EDUCATION/TRAINING PROGRAM

## 2021-12-08 RX ORDER — ACETAMINOPHEN 325 MG/1
325 TABLET ORAL EVERY 6 HOURS PRN
Qty: 100 TABLET | Refills: 3 | Status: SHIPPED | OUTPATIENT
Start: 2021-12-08 | End: 2022-07-13

## 2021-12-08 NOTE — PROGRESS NOTES
I have personally reviewed the history and examination as documented by Dr. Michaels.  I was present during key portions of the visit and agree with the assessment and plan as documented for 86 yr old female here for neck/back pain. No red flags. Med regimen adjusted. Home PT.  Precautions given. Anticipatory guidance given.     Amilcar Avina MD  December 8, 2021  11:15 AM

## 2021-12-08 NOTE — PROGRESS NOTES
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  No midline tenderness and no indication for imaging at this time as there is no recent injury.  No red flag symptoms.  Will prescribe low dose Tylenol and refer to home health given pain and concerns for cognitive difficulties and ability to care for self.  Plan: acetaminophen (TYLENOL) 325 MG tablet, HOME         CARE NURSING REFERRAL          (F32.2) Current severe episode of major depressive disorder without psychotic features without prior episode (H)  Comment: Not in exacerbation.  Historical problem.  Plan: As above.    (B18.1) Chronic hepatitis B virus infection (H)  Comment: Historical problem.  Acetaminophen intake no greater than 2 g daily.  No documented h/o cirrhosis.  Plan: As above.    (Z23) Need for prophylactic vaccination and inoculation against influenza  Comment: Due for flu shot.  Plan: INFLUENZA, QUAD, HIGH DOSE, PF, 65YR + (FLUZONE        HD)          Follow up in 4 Week(s).    Options for treatment and follow-up care were reviewed with the patient and/or guardian. Ranjeet Toure and/or guardian engaged in the decision making process and verbalized understanding of the options discussed and agreed with the final plan.    Marty Michaels MD  [facility], PGY-3    Precepted patient with Dr. Amilcar Avina       HPI:   Ranjeet Toure is a 86 year old female who presents to clinic today for   Chief Complaint   Patient presents with     Headache     onset monday headache with neck pain- pain level 7- starts at back of head and sometimes goes all the way down to shoulders     Medication Reconciliation     Imm/Inj     Flu Shot     Patient has been having problems with neck and  back pain for years and continues to have problems with these.  No new injuries or pain.  No numbness, tingling, radiation of pain.  Worst when she moves around.  Has not tried any medications.    A Cris  was used for this visit.         Review of Systems:     10 point ROS negative except as noted in HPI.         PMHX:   Active Problems List  Patient Active Problem List   Diagnosis     Benign essential hypertension     Chronic hepatitis B virus infection (H)     Tear film insufficiency     Hearing loss     Osteoarthrosis involving lower leg     Chest pain, unspecified type     Hypokalemia     DNR (do not resuscitate)     Frailty     Age-related osteoporosis without current pathological fracture     Memory impairment     PTSD (post-traumatic stress disorder)     Current severe episode of major depressive disorder without psychotic features without prior episode (H)     Blurry vision     Cataract     Motion sickness     Chronic renal failure     Neck pain     Pain in joint, shoulder region     Active problem list reviewed and updated.    Current Medications  Current Outpatient Medications   Medication Sig Dispense Refill     acetaminophen (TYLENOL) 325 MG tablet Take 1 tablet (325 mg) by mouth every 6 hours as needed for mild pain (No more than 2000 mg in 24 hours) 100 tablet 3     amLODIPine (NORVASC) 10 MG tablet Take 1 tablet (10 mg) by mouth daily 90 tablet 3     acetaminophen (TYLENOL) 500 MG tablet Take 2 tablets (1,000 mg) by mouth 2 times daily as needed for mild pain 100 tablet 3     albuterol (PROAIR HFA/PROVENTIL HFA/VENTOLIN HFA) 108 (90 Base) MCG/ACT inhaler Inhale 2 puffs into the lungs every 6 hours 8.5 g 0     alendronate (FOSAMAX) 70 MG tablet Take 1 tablet (70 mg) by mouth every 7 days 16 tablet 1     hypromellose-dextran 0.3-0.1% (ARTIFICIAL TEARS) opthalmic solution Apply 1-2 drops to eye 4 times daily as needed       order for DME Equipment being ordered: 4 wheeled walker with seat. 1  Units 0     potassium chloride ER (KLOR-CON) 20 MEQ CR tablet Take 1 tablet (20 mEq) by mouth 2 times daily 60 tablet 1     Spacer/Aero-Holding Chambers (AEROCHAMBER Z-STAT PLUS CHAMBR) MISC To use with HFA inhaler as directed. 1 each 0     Medication list reviewed and updated.    Social History  Social History     Tobacco Use     Smoking status: Never Smoker     Smokeless tobacco: Never Used   Substance Use Topics     Alcohol use: No     Alcohol/week: 0.0 standard drinks     Drug use: No     History   Drug Use No       Family History  Family History   Problem Relation Age of Onset     Diabetes No family hx of      Coronary Artery Disease No family hx of      Hypertension No family hx of      Hyperlipidemia No family hx of      Breast Cancer No family hx of      Cancer - colorectal No family hx of      Ovarian Cancer No family hx of      Prostate Cancer No family hx of      Other Cancer No family hx of      Mental Illness No family hx of      Cerebrovascular Disease No family hx of      Anesthesia Reaction No family hx of      Asthma No family hx of      Osteoporosis No family hx of      Known Genetic Syndrome No family hx of      Obesity No family hx of      Unknown/Adopted No family hx of      Colon Cancer No family hx of      Depression No family hx of      Anxiety Disorder No family hx of      Mental Illness No family hx of      Substance Abuse No family hx of      Genetic Disorder No family hx of      Thyroid Disease No family hx of        Allergies  Allergies   Allergen Reactions     No Known Allergies             Physical Exam:     Vitals:    12/08/21 1102   BP: 116/66   Pulse: 74   Temp: 98.2  F (36.8  C)   TempSrc: Oral   SpO2: 100%   Weight: 45.4 kg (100 lb)     Body mass index is 22.49 kg/m .    GENERAL APPEARANCE: alert, appears stated age, no acute distress  HEENT: Eyes grossly normal to inspection, nares normal, MMM  RESP: lungs clear to auscultation - no rales, rhonchi, or wheezes, no increased  respiratory effort  CV: regular rate and rhythm, no murmurs  ABDOMEN: nondistended  MSK: extremities normal, no gross deformities noted, no lower extremity edema, no midline tenderness on back or neck  SKIN: no suspicious lesions or rashes   NEURO: Normal strength and tone, sensory exam grossly normal, mentation appears intact and speech normal  PSYCH: mood and affect appropriate

## 2021-12-08 NOTE — NURSING NOTE
Due to patient being non-English speaking/uses sign language, an  was used for this visit. Only for face-to-face interpretation by an external agency, date and length of interpretation can be found on the scanned worksheet.     name: Way   Agency: [employer]  Language: Cris   Telephone number: [phone]  Type of interpretation: Telephone, spoken

## 2021-12-31 ENCOUNTER — TELEPHONE (OUTPATIENT)
Dept: FAMILY MEDICINE | Facility: CLINIC | Age: 86
End: 2021-12-31
Payer: COMMERCIAL

## 2021-12-31 NOTE — TELEPHONE ENCOUNTER
Linda with [facility] is wondering if you would be willing to addend your 12/8 clinic note to state the cause and region of the patient's back pain.  Thank you

## 2022-01-07 DIAGNOSIS — E87.6 HYPOKALEMIA: ICD-10-CM

## 2022-01-14 RX ORDER — POTASSIUM CHLORIDE 1500 MG/1
20 TABLET, EXTENDED RELEASE ORAL 2 TIMES DAILY
Qty: 60 TABLET | Refills: 1 | Status: SHIPPED | OUTPATIENT
Start: 2022-01-14 | End: 2022-05-31

## 2022-01-20 NOTE — TELEPHONE ENCOUNTER
Linda Cortes called again stating your addendum is not sufficient and insurance won't cover.  They need a more specific region and cause to be able to start home care.

## 2022-05-31 DIAGNOSIS — E87.6 HYPOKALEMIA: ICD-10-CM

## 2022-05-31 RX ORDER — POTASSIUM CHLORIDE 1500 MG/1
20 TABLET, EXTENDED RELEASE ORAL 2 TIMES DAILY
Qty: 60 TABLET | Refills: 0 | Status: SHIPPED | OUTPATIENT
Start: 2022-05-31 | End: 2022-07-13

## 2022-07-13 DIAGNOSIS — G89.29 CHRONIC MIDLINE LOW BACK PAIN WITHOUT SCIATICA: ICD-10-CM

## 2022-07-13 DIAGNOSIS — I10 BENIGN ESSENTIAL HYPERTENSION: ICD-10-CM

## 2022-07-13 DIAGNOSIS — E87.6 HYPOKALEMIA: ICD-10-CM

## 2022-07-13 DIAGNOSIS — R05.9 COUGH: ICD-10-CM

## 2022-07-13 DIAGNOSIS — M81.0 AGE-RELATED OSTEOPOROSIS WITHOUT CURRENT PATHOLOGICAL FRACTURE: ICD-10-CM

## 2022-07-13 DIAGNOSIS — H04.123 DRY EYES: Primary | ICD-10-CM

## 2022-07-13 DIAGNOSIS — M54.50 CHRONIC MIDLINE LOW BACK PAIN WITHOUT SCIATICA: ICD-10-CM

## 2022-07-13 NOTE — TELEPHONE ENCOUNTER
[facility] phone call message- medication clarification/question:    Full Medication Name: ALL MEDICATION   Dose:     Question: Per Angela patient has not received refills as they called pharmacy to request but has yet heard back. Please fill medication or call to advise      Pharmacy confirmed as    [id number] - [place], [place] - [address]  : Yes    OK to leave a message on voice mail? Yes    Primary language: Cris      needed? Yes    Call taken on July 13, 2022 at 4:25 PM by Leta Mcclain

## 2022-07-15 RX ORDER — ALBUTEROL SULFATE 90 UG/1
2 AEROSOL, METERED RESPIRATORY (INHALATION) EVERY 6 HOURS
Qty: 8.5 G | Refills: 3 | Status: SHIPPED | OUTPATIENT
Start: 2022-07-15

## 2022-07-15 RX ORDER — POTASSIUM CHLORIDE 1500 MG/1
20 TABLET, EXTENDED RELEASE ORAL 2 TIMES DAILY
Qty: 60 TABLET | Refills: 0 | Status: SHIPPED | OUTPATIENT
Start: 2022-07-15 | End: 2023-10-17

## 2022-07-15 RX ORDER — AMLODIPINE BESYLATE 10 MG/1
10 TABLET ORAL DAILY
Qty: 90 TABLET | Refills: 3 | Status: SHIPPED | OUTPATIENT
Start: 2022-07-15 | End: 2024-01-16

## 2022-07-15 RX ORDER — ALENDRONATE SODIUM 70 MG/1
70 TABLET ORAL
Qty: 16 TABLET | Refills: 0 | Status: SHIPPED | OUTPATIENT
Start: 2022-07-15

## 2022-07-15 RX ORDER — ACETAMINOPHEN 325 MG/1
325 TABLET ORAL EVERY 6 HOURS PRN
Qty: 100 TABLET | Refills: 0 | Status: SHIPPED | OUTPATIENT
Start: 2022-07-15 | End: 2024-01-16

## 2022-07-15 RX ORDER — ACETAMINOPHEN 500 MG
1000 TABLET ORAL 2 TIMES DAILY PRN
Qty: 100 TABLET | Refills: 3 | Status: SHIPPED | OUTPATIENT
Start: 2022-07-15

## 2022-07-15 RX ORDER — DIPHENHYDRAMINE HCL 25 MG
1-2 CAPSULE ORAL 4 TIMES DAILY PRN
Qty: 30 ML | Refills: 3 | Status: SHIPPED | OUTPATIENT
Start: 2022-07-15

## 2022-07-18 NOTE — TELEPHONE ENCOUNTER
Caller advised how is this going and would like to know. Patient is out of medication. Would like Dr. Hogan to give her a call back due to language barrier. Please call and advise. Thank you.

## 2022-07-19 ENCOUNTER — TELEPHONE (OUTPATIENT)
Dept: FAMILY MEDICINE | Facility: CLINIC | Age: 87
End: 2022-07-19

## 2022-07-19 DIAGNOSIS — H04.123 DRY EYES: Primary | ICD-10-CM

## 2022-07-19 NOTE — TELEPHONE ENCOUNTER
I advised Angela medication was send, however the pharmacy they send to I know patient are advising that it is closed. I advised case manger to let patient know that they will need to call [facility] and have it transfer due to it will be easier on the patient end vs if we was to do it, we will have to cancel the order and restart the process and it will talk another 2-3 business days. Angela stand she understands and will call patient to let them know.

## 2022-07-19 NOTE — TELEPHONE ENCOUNTER
Prior Authorization needed on:  07/19/2022  Medication:  GENTEAL EYE DROPS  Dose:  1-2 DROPS IN EYES    Pharmacy confirmed as       [id number] - [place], [place] - [address]  [address]  [place] [address]  Phone: [phone] Fax: [phone]      Insurance Name:  [employer] [place]   Insurance Phone: [phone]   Insurance Patient ID: [id number]     Alternatives Suggested:  NONE    Patricia Araya July 19, 2022 at 4:03 PM

## 2022-08-18 NOTE — PROGRESS NOTES
L2 transverse process fracture.  Nonoperative management.  Analgesia.   Noted resolved hypokalemia. Will discuss with patient tomorrow in clinic.

## 2022-09-23 DIAGNOSIS — E87.6 HYPOKALEMIA: ICD-10-CM

## 2022-12-22 RX ORDER — POTASSIUM CHLORIDE 1500 MG/1
TABLET, EXTENDED RELEASE ORAL
Qty: 60 TABLET | Refills: 0 | OUTPATIENT
Start: 2022-12-22

## 2022-12-27 NOTE — MR AVS SNAPSHOT
"              After Visit Summary   8/23/2018    Ranjeet Toure    MRN: [id number]           Patient Information     Date Of Birth          1935        Visit Information        Provider Department      8/23/2018 3:00 PM Amie Lockwood MD [facility]        Today's Diagnoses     Benign essential hypertension    -  1    Hypokalemia           Follow-ups after your visit        Your next 10 appointments already scheduled     Sep 04, 2018 10:00 AM CDT   Return Visit with Angie Neves MD   [facility] ([facility])    [address] [place] [id number]   [phone]              Who to contact     Please call your clinic at [phone] to:    Ask questions about your health    Make or cancel appointments    Discuss your medicines    Learn about your test results    Speak to your doctor            Additional Information About Your Visit        Care EveryWhere ID     This is your Care EveryWhere ID. This could be used by other organizations to access your [place] medical records  [id number]        Your Vitals Were     Pulse Temperature Respirations Height Pulse Oximetry BMI (Body Mass Index)    63 97.9  F (36.6  C) 19 4' 9\" (144.8 cm) 97% 21.86 kg/m2       Blood Pressure from Last 3 Encounters:   08/27/18 157/67   08/23/18 (!) 219/75   08/20/18 (!) 203/70    Weight from Last 3 Encounters:   08/27/18 100 lb 6.4 oz (45.5 kg)   08/23/18 101 lb (45.8 kg)   08/20/18 98 lb 9.6 oz (44.7 kg)              We Performed the Following     Basic Metabolic Panel ([facility]) - Results < 1 hr      : Sign Language or Oral - 53-67 minutes          Today's Medication Changes          These changes are accurate as of 8/23/18 11:59 PM.  If you have any questions, ask your nurse or doctor.               Start taking these medicines.        Dose/Directions    amLODIPine 2.5 MG tablet   Commonly known as:  NORVASC   Used for:  Benign essential hypertension   Started by:  Amie Lockwood " MD Colleen        Dose:  2.5 mg   Take 1 tablet (2.5 mg) by mouth daily   Quantity:  30 tablet   Refills:  1            Where to get your medicines      These medications were sent to [id number] - [place] - [address]  [address], [place] [address]     Phone:  [phone]     amLODIPine 2.5 MG tablet                Primary Care Provider Office Phone # Fax #    Amie Colleen Lockwood [phone]       [facility] [address] [place] [address]        Equal Access to Services     [facility]: Hadii aad ku hadasho Soomaali, waaxda luqadaha, qaybta kaalmada adeegyada, waxay idiin hayaan adeeg kiran oneill . So [facility] [phone].    ATENCIÓN: Si habla español, tiene a mooney disposición servicios gratuitos de asistencia lingüística. [facility] [phone].    We comply with applicable federal civil rights laws and [place] laws. We do not discriminate on the basis of race, color, national origin, age, disability, sex, sexual orientation, or gender identity.            Thank you!     Thank you for choosing [facility]  for your care. Our goal is always to provide you with excellent care. Hearing back from our patients is one way we can continue to improve our services. Please take a few minutes to complete the written survey that you may receive in the mail after your visit with us. Thank you!             Your Updated Medication List - Protect others around you: Learn how to safely use, store and throw away your medicines at www.disposemymeds.org.          This list is accurate as of 8/23/18 11:59 PM.  Always use your most recent med list.                   Brand Name Dispense Instructions for use Diagnosis    acetaminophen 500 MG tablet    TYLENOL    100 tablet    Take 2 tablets (1,000 mg) by mouth 2 times daily as needed for mild pain    Chronic midline low back pain, with sciatica presence unspecified        albuterol 108 (90 Base) MCG/ACT inhaler    PROAIR HFA/PROVENTIL HFA/VENTOLIN HFA     Inhale 2 puffs into the lungs every 4 hours as needed        amLODIPine 2.5 MG tablet    NORVASC    30 tablet    Take 1 tablet (2.5 mg) by mouth daily    Benign essential hypertension       glycerin (adult) 2 g Supp Suppository    [facility] GLYCERIN ADULT    1 suppository    Place 1 suppository rectally daily as needed for constipation    Constipation, unspecified constipation type       hypromellose-dextran 0.3-0.1% 0.1-0.3 % Soln ophthalmic solution   Generic drug:  hypromellose-dextran      Apply 1-2 drops to eye 4 times daily as needed        losartan 100 MG tablet    COZAAR    90 tablet    Take 1 tablet (100 mg) by mouth daily    Benign essential hypertension       order for DME     1 Units    Equipment being ordered: Blood pressure cuff and monitor - automatic.    Benign essential hypertension          Yes

## 2023-02-12 DIAGNOSIS — E87.6 HYPOKALEMIA: ICD-10-CM

## 2023-03-03 DIAGNOSIS — E87.6 HYPOKALEMIA: ICD-10-CM

## 2023-05-13 RX ORDER — POTASSIUM CHLORIDE 1500 MG/1
TABLET, EXTENDED RELEASE ORAL
Qty: 60 TABLET | Refills: 0 | OUTPATIENT
Start: 2023-05-13

## 2023-06-01 RX ORDER — POTASSIUM CHLORIDE 1500 MG/1
TABLET, EXTENDED RELEASE ORAL
Qty: 60 TABLET | Refills: 0 | OUTPATIENT
Start: 2023-06-01

## 2023-10-17 ENCOUNTER — MEDICAL CORRESPONDENCE (OUTPATIENT)
Dept: HEALTH INFORMATION MANAGEMENT | Facility: CLINIC | Age: 88
End: 2023-10-17
Payer: COMMERCIAL

## 2023-10-17 DIAGNOSIS — E87.6 HYPOKALEMIA: ICD-10-CM

## 2023-10-17 NOTE — TELEPHONE ENCOUNTER
[facility] phone call message- medication clarification/question:    Full Medication Name: potassium chloride ER (KLOR-CON) 20 MEQ CR tablet        Question: calling in for refill  of above medication -- please fill if ablke to the below pharmacy    Pharmacy confirmed as    [id number] - [place], [place] - [address]  Yes    OK to leave a message on voice mail? Yes    Primary language: Cris      needed? Yes    Call taken on October 17, 2023 at 3:12 PM by Alba Montanez

## 2023-10-18 RX ORDER — POTASSIUM CHLORIDE 1500 MG/1
20 TABLET, EXTENDED RELEASE ORAL 2 TIMES DAILY
Qty: 60 TABLET | Refills: 0 | Status: SHIPPED | OUTPATIENT
Start: 2023-10-18 | End: 2024-01-18

## 2023-10-20 ENCOUNTER — MEDICAL CORRESPONDENCE (OUTPATIENT)
Dept: HEALTH INFORMATION MANAGEMENT | Facility: CLINIC | Age: 88
End: 2023-10-20
Payer: COMMERCIAL

## 2024-01-15 NOTE — PATIENT INSTRUCTIONS
PERSONAL PREVENTIVE SERVICES PLAN - SERVICES     Review these tests with your medical staff then decide which ones you want and take this page home for your reference      SCREENING TESTS     Description   Year of Last Screening   Recommended Today?   Heart disease screening blood tests  Cholesterol level Reducing cholesterol can reduce your risk of heart attacks by 25%.  Screening is recommended yearly if you are at risk of heart disease otherwise every 4-5 years 2015 Yes; Recommended and ordered.   Diabetes screening tests  Hemoglobin A1c blood test   Finding and treating diabetes early can reduce complications.  Screening recommended/covered yearly if you have high blood pressure, high cholesterol, obesity (BMI >30), or a history of high blood glucose tests; or 2 of the following: family history of diabetes, overweight (BMI >25 but <30), age 65 years or older, and a history of diabetes of pregnancy or gave birth to baby weighing more than 9 lbs. 2015 No, not indicated.   Hepatitis B screening Finding hepatitis B early can reduce complications.  Screening is recommended for persons with selected risk factors. 2013 No; is up to date, has dx of chronic hep B   Hepatitis C screening Finding hepatitis C early can reduce complications.  Screening is recommended for all persons born from 1945 through 1965 and for those with selected other risk factors.  2013 No; is up to date.   HIV screening Finding HIV early can reduce complications.  Screening is recommended for persons with risk factors for HIV infection. 2013 No; is up to date.   Glaucoma screening Early detection of glaucoma can prevent blindness.   Please talk to your eye doctor about this.       SCREENING TESTS     Description   Year of Last Screening   Recommended Today?   Colorectal cancer screening  Fecal occult blood test   Screening colonoscopy Screening for colon cancer has been shown to reduce death from colon cancer by 25-30%. Screening recommended to  start at 50 years and continuing until age 75 years.   - No: is not indicated today.   Breast Cancer Screening (women)  Mammogram Mammogram screening for breast cancer has been shown to reduce the risk of dying from breast cancer and prolong life. Screening is recommended every 1-2 years for women aged 50 to 74 years.  - No: is not indicated today.   Cervical Cancer screening (women)  Pap Cervical pap smears can reduce cervical cancer. Screening is recommended annually if high risk (history of abnormal pap smears) otherwise every 2-3 years, stop screening at 65 years of age if history of normal paps. - No: is not indicated today.   Screening for Osteoporosis:  Bone mass measurements (women)  Dexa Scan Screening and treating Osteoporosis can reduce the risk of hip and spine fractures. Screening is recommended in women 65 years or older and in women and men at risk of osteoporosis. 2019 No; is up to date.   Screening for Lung Cancer   Low-dose CT scanning Screening can reduce mortality in persons aged 55-80 who have smoked at least 30 pack-years and who are either still smoking or have quit in the past 15 years. - No: is not indicated today.   Abdominal Aortic Aneurysm (AAA) screening  Ultrasound (US)   An aneurysm treated before rupture is very safe -a ruptured aneurysm can be fatal.  Screening  by US for AAA is limited to patients who meet one of the following criteria:  Men who are 65-75 years old and have smoked more than 100 cigarettes in their lifetime  Anyone with a family history of abdominal aortic aneurysm - No: is not indicated today.     Here are your recommended immunizations.  Take this home for your reference.  Also recommend RSV and COVID vaccines                                                    IMMUNIZATIONS Description Recommend today?     Influenza (Flu shot) Prevents flu; should get every year Yes; Recommended and ordered.   PCV 13 Pneumonia vaccination; you get it once No; is up to date.   PPSV  23 Second pneumonia vaccination; usually get it 1 year after PCV 13 No; is up to date.   Zoster (Shingles) Prevents shingles; you get it once  (Check with Part D insurance for coverage, must receive at a pharmacy, not clinic) Yes; Recommended    Tetanus Prevents tetanus; once every 10 years Yes; Recommended      Hepatitis B  If you have any of the following risk factors you should be immunized for hepatitis B: severe kidney disease, people who live in the same house as a carrier of Hepatitis B virus, people who live in  institutions (e.g. nursing homes or group homes), homosexual men, patients with hemophilia who received Factor VIII or IX concentrates, abusers of illicit injectable drugs No: is not indicated today.      PATIENT INSTRUCTIONS    Yearly exam:   See your health care provider every year in order to review changes in your health, review medicines that you take, and discuss preventive care needs such as immunizations and cancer screening.  Get a flu shot each year.     Advance Directives:  If you have not done so, you are encouraged to complete advance directives and/or a living will.   More information about advance directives can be found at: http://www.[facility].org/advocacy/Key-Issues/Advance-Directives    Nutrition:   Eat at least 5 servings of fruits and vegetables each day.   Eat whole-grain bread, whole-wheat pasta and brown rice instead of white grains and rice.   Talk to your doctor about Calcium and Vitamin D.     Lifestyle:  Exercise for at least 150 minutes a week (30 minutes a day, 5 days a week). This will help you control your weight and prevent disease.   Limit alcohol to one drink per day.   If you smoke, try to quit - your doctor will be happy to help.   Wear sunscreen to prevent skin cancer.   See your dentist every six months for an exam and cleaning.   See your eye doctor every 1 to 2 years to screen for conditions such as glaucoma, macular degeneration and  cataracts.                            Patient Education   Personalized Prevention Plan  You are due for the preventive services outlined below.  Your care team is available to assist you in scheduling these services.  If you have already completed any of these items, please share that information with your care team to update in your medical record.  Health Maintenance Due   Topic Date Due     Kidney Microalbumin Urine Test  Never done     Depression Action Plan  Never done     COVID-19 Vaccine (1) Never done     Urine Test  Never done     Hepatitis A Vaccine (1 of 2 - Risk 2-dose series) 01/01/1954     RSV VACCINE (Pregnancy & 60+) (1 - 1-dose 60+ series) Never done     Hepatitis B Vaccine (2 of 3 - Risk 3-dose series) 01/04/2013     Zoster (Shingles) Vaccine (2 of 3) 09/10/2013     Cholesterol Lab  04/21/2016     Preventing Falls: Care Instructions  Injuries and health problems such as trouble walking or poor eyesight can increase your risk of falling. So can some medicines. But there are things you can do to help prevent falls. You can exercise to get stronger. You can also arrange your home to make it safer.    Talk to your doctor about the medicines you take. Ask if any of them increase the risk of falls and whether they can be changed or stopped.   Try to exercise regularly. It can help improve your strength and balance. This can help lower your risk of falling.     Practice fall safety and prevention.    Wear low-heeled shoes that fit well and give your feet good support. Talk to your doctor if you have foot problems that make this hard.  Carry a cellphone or wear a medical alert device that you can use to call for help.  Use stepladders instead of chairs to reach high objects. Don't climb if you're at risk for falls. Ask for help, if needed.  Wear the correct eyeglasses, if you need them.    Make your home safer.    Remove rugs, cords, clutter, and furniture from walkways.  Keep your house well lit. Use  "night-lights in hallways and bathrooms.  Install and use sturdy handrails on stairways.  Wear nonskid footwear, even inside. Don't walk barefoot or in socks without shoes.    Be safe outside.    Use handrails, curb cuts, and ramps whenever possible.  Keep your hands free by using a shoulder bag or backpack.  Try to walk in well-lit areas. Watch out for uneven ground, changes in pavement, and debris.  Be careful in the winter. Walk on the grass or gravel when sidewalks are slippery. Use de-icer on steps and walkways. Add non-slip devices to shoes.    Put grab bars and nonskid mats in your shower or tub and near the toilet. Try to use a shower chair or bath bench when bathing.   Get into a tub or shower by putting in your weaker leg first. Get out with your strong side first. Have a phone or medical alert device in the bathroom with you.   Where can you learn more?  Go to https://www.[employer].net/patiented  Enter G117 in the search box to learn more about \"Preventing Falls: Care Instructions.\"  Current as of: July 18, 2023               Content Version: 13.8    2150-9259 [employer]   Care instructions adapted under license by your healthcare professional. If you have questions about a medical condition or this instruction, always ask your healthcare professional. [employer] disclaims any warranty or liability for your use of this information.      How to Get Up Safely After a Fall: Care Instructions  Overview     If you have injuries, health problems, or other reasons that may make it easy for you to fall at home, it is a good idea to learn how to get up safely after a fall. Learning how to get up correctly can help you avoid making an injury worse.  Also, knowing what to do if you cannot get up can help you stay safe until help arrives.  Follow-up care is a key part of your treatment and safety. Be sure to make and go to all appointments, and call your doctor if you are having problems. " It's also a good idea to know your test results and keep a list of the medicines you take.  How can you care for yourself after a fall?  If you think you can get up  First lie still for a few minutes and think about how you feel. If your body feels okay and you think you can get up safely, follow the rest of the steps below:  Look for a chair or other piece of furniture that is close to you.  Roll onto your side and rest. Roll by turning your head in the direction you want to roll, move your shoulder and arm, then hip and leg in the same direction.  Lie still for a moment to let your blood pressure adjust.  Slowly push your upper body up, lift your head, and take a moment to rest.  Slowly get up on your hands and knees, and crawl to the chair or other stable piece of furniture.  Put your hands on the chair.  Move one foot forward, and place it flat on the floor. Your other leg should be bent with the knee on the floor.  Rise slowly, turn your body, and sit in the chair. Stay seated for a bit and think about how you feel. Call for help. Even if you feel okay, let someone know what happened to you. You might not know that you have a serious injury.  If you cannot get up  If you think you are injured after a fall or you cannot get up, try not to panic.  Call out for help.  If you have a phone within reach or you have an emergency call device, use it to call for help.  If you do not have a phone within reach, try to slide yourself toward it. If you cannot get to the phone, try to slide toward a door or window or a place where you think you can be heard.  [place] or use an object to make noise so someone might hear you.  If you can reach something that you can use for a pillow, place it under your head. Try to stay warm by covering yourself with a blanket or clothing while you wait for help.  When should you call for help?   Call 911 anytime you think you may need emergency care. For example, call if:    You passed out  "(lost consciousness).     You cannot get up after a fall.     You have severe pain.   Call your doctor now or seek immediate medical care if:    You have new or worse pain.     You are dizzy or lightheaded.     You hit your head.   Watch closely for changes in your health, and be sure to contact your doctor if:    You do not get better as expected.   Where can you learn more?  Go to https://www.[employer].net/patiented  Enter G513 in the search box to learn more about \"How to Get Up Safely After a Fall: Care Instructions.\"  Current as of: November 13, 2022               Content Version: 13.8    8975-7002 [employer].   Care instructions adapted under license by your healthcare professional. If you have questions about a medical condition or this instruction, always ask your healthcare professional. [employer] disclaims any warranty or liability for your use of this information.         Patient Education   Personalized Prevention Plan  You are due for the preventive services outlined below.  Your care team is available to assist you in scheduling these services.  If you have already completed any of these items, please share that information with your care team to update in your medical record.  Health Maintenance Due   Topic Date Due     Kidney Microalbumin Urine Test  Never done     Depression Action Plan  Never done     COVID-19 Vaccine (1) Never done     Urine Test  Never done     Hepatitis A Vaccine (1 of 2 - Risk 2-dose series) 01/01/1954     RSV VACCINE (Pregnancy & 60+) (1 - 1-dose 60+ series) Never done     Hepatitis B Vaccine (2 of 3 - Risk 3-dose series) 01/04/2013     Zoster (Shingles) Vaccine (2 of 3) 09/10/2013     Cholesterol Lab  04/21/2016     Preventing Falls: Care Instructions  Injuries and health problems such as trouble walking or poor eyesight can increase your risk of falling. So can some medicines. But there are things you can do to help prevent falls. You can " "exercise to get stronger. You can also arrange your home to make it safer.    Talk to your doctor about the medicines you take. Ask if any of them increase the risk of falls and whether they can be changed or stopped.   Try to exercise regularly. It can help improve your strength and balance. This can help lower your risk of falling.     Practice fall safety and prevention.    Wear low-heeled shoes that fit well and give your feet good support. Talk to your doctor if you have foot problems that make this hard.  Carry a cellphone or wear a medical alert device that you can use to call for help.  Use stepladders instead of chairs to reach high objects. Don't climb if you're at risk for falls. Ask for help, if needed.  Wear the correct eyeglasses, if you need them.    Make your home safer.    Remove rugs, cords, clutter, and furniture from walkways.  Keep your house well lit. Use night-lights in hallways and bathrooms.  Install and use sturdy handrails on stairways.  Wear nonskid footwear, even inside. Don't walk barefoot or in socks without shoes.    Be safe outside.    Use handrails, curb cuts, and ramps whenever possible.  Keep your hands free by using a shoulder bag or backpack.  Try to walk in well-lit areas. Watch out for uneven ground, changes in pavement, and debris.  Be careful in the winter. Walk on the grass or gravel when sidewalks are slippery. Use de-icer on steps and walkways. Add non-slip devices to shoes.    Put grab bars and nonskid mats in your shower or tub and near the toilet. Try to use a shower chair or bath bench when bathing.   Get into a tub or shower by putting in your weaker leg first. Get out with your strong side first. Have a phone or medical alert device in the bathroom with you.   Where can you learn more?  Go to https://www.[employer].net/patiented  Enter G117 in the search box to learn more about \"Preventing Falls: Care Instructions.\"  Current as of: July 18, " 2023               Content Version: 13.8    0615-9923 [employer]   Care instructions adapted under license by your healthcare professional. If you have questions about a medical condition or this instruction, always ask your healthcare professional. [employer] disclaims any warranty or liability for your use of this information.      How to Get Up Safely After a Fall: Care Instructions  Overview     If you have injuries, health problems, or other reasons that may make it easy for you to fall at home, it is a good idea to learn how to get up safely after a fall. Learning how to get up correctly can help you avoid making an injury worse.  Also, knowing what to do if you cannot get up can help you stay safe until help arrives.  Follow-up care is a key part of your treatment and safety. Be sure to make and go to all appointments, and call your doctor if you are having problems. It's also a good idea to know your test results and keep a list of the medicines you take.  How can you care for yourself after a fall?  If you think you can get up  First lie still for a few minutes and think about how you feel. If your body feels okay and you think you can get up safely, follow the rest of the steps below:  Look for a chair or other piece of furniture that is close to you.  Roll onto your side and rest. Roll by turning your head in the direction you want to roll, move your shoulder and arm, then hip and leg in the same direction.  Lie still for a moment to let your blood pressure adjust.  Slowly push your upper body up, lift your head, and take a moment to rest.  Slowly get up on your hands and knees, and crawl to the chair or other stable piece of furniture.  Put your hands on the chair.  Move one foot forward, and place it flat on the floor. Your other leg should be bent with the knee on the floor.  Rise slowly, turn your body, and sit in the chair. Stay seated for a bit and think about how you  "feel. Call for help. Even if you feel okay, let someone know what happened to you. You might not know that you have a serious injury.  If you cannot get up  If you think you are injured after a fall or you cannot get up, try not to panic.  Call out for help.  If you have a phone within reach or you have an emergency call device, use it to call for help.  If you do not have a phone within reach, try to slide yourself toward it. If you cannot get to the phone, try to slide toward a door or window or a place where you think you can be heard.  [place] or use an object to make noise so someone might hear you.  If you can reach something that you can use for a pillow, place it under your head. Try to stay warm by covering yourself with a blanket or clothing while you wait for help.  When should you call for help?   Call 911 anytime you think you may need emergency care. For example, call if:    You passed out (lost consciousness).     You cannot get up after a fall.     You have severe pain.   Call your doctor now or seek immediate medical care if:    You have new or worse pain.     You are dizzy or lightheaded.     You hit your head.   Watch closely for changes in your health, and be sure to contact your doctor if:    You do not get better as expected.   Where can you learn more?  Go to https://www.[employer].net/patiented  Enter G513 in the search box to learn more about \"How to Get Up Safely After a Fall: Care Instructions.\"  Current as of: November 13, 2022               Content Version: 13.8    8897-4909 [employer].   Care instructions adapted under license by your healthcare professional. If you have questions about a medical condition or this instruction, always ask your healthcare professional. [employer] disclaims any warranty or liability for your use of this information.         "

## 2024-01-16 ENCOUNTER — OFFICE VISIT (OUTPATIENT)
Dept: FAMILY MEDICINE | Facility: CLINIC | Age: 89
End: 2024-01-16
Payer: COMMERCIAL

## 2024-01-16 VITALS
HEIGHT: 55 IN | RESPIRATION RATE: 22 BRPM | HEART RATE: 72 BPM | TEMPERATURE: 97.4 F | OXYGEN SATURATION: 99 % | DIASTOLIC BLOOD PRESSURE: 75 MMHG | SYSTOLIC BLOOD PRESSURE: 201 MMHG | WEIGHT: 99.75 LBS | BODY MASS INDEX: 23.09 KG/M2

## 2024-01-16 DIAGNOSIS — R26.9 ABNORMALITY OF GAIT AND MOBILITY: ICD-10-CM

## 2024-01-16 DIAGNOSIS — N18.9 CHRONIC RENAL FAILURE, UNSPECIFIED CKD STAGE: ICD-10-CM

## 2024-01-16 DIAGNOSIS — G89.29 CHRONIC MIDLINE LOW BACK PAIN WITHOUT SCIATICA: ICD-10-CM

## 2024-01-16 DIAGNOSIS — Z00.00 ENCOUNTER FOR MEDICARE ANNUAL WELLNESS EXAM: Primary | ICD-10-CM

## 2024-01-16 DIAGNOSIS — E87.6 HYPOKALEMIA: ICD-10-CM

## 2024-01-16 DIAGNOSIS — Z00.00 WELLNESS EXAMINATION: Primary | ICD-10-CM

## 2024-01-16 DIAGNOSIS — M54.50 CHRONIC MIDLINE LOW BACK PAIN WITHOUT SCIATICA: ICD-10-CM

## 2024-01-16 DIAGNOSIS — I10 BENIGN ESSENTIAL HYPERTENSION: ICD-10-CM

## 2024-01-16 LAB
ERYTHROCYTE [DISTWIDTH] IN BLOOD BY AUTOMATED COUNT: 14.4 % (ref 10–15)
HCT VFR BLD AUTO: 37.9 % (ref 35–47)
HGB BLD-MCNC: 12.2 G/DL (ref 11.7–15.7)
MCH RBC QN AUTO: 30.6 PG (ref 26.5–33)
MCHC RBC AUTO-ENTMCNC: 32.2 G/DL (ref 31.5–36.5)
MCV RBC AUTO: 95 FL (ref 78–100)
PLATELET # BLD AUTO: 262 10E3/UL (ref 150–450)
RBC # BLD AUTO: 3.99 10E6/UL (ref 3.8–5.2)
WBC # BLD AUTO: 5 10E3/UL (ref 4–11)

## 2024-01-16 PROCEDURE — 90662 IIV NO PRSV INCREASED AG IM: CPT

## 2024-01-16 PROCEDURE — 90471 IMMUNIZATION ADMIN: CPT

## 2024-01-16 PROCEDURE — 80048 BASIC METABOLIC PNL TOTAL CA: CPT

## 2024-01-16 PROCEDURE — 99397 PER PM REEVAL EST PAT 65+ YR: CPT | Mod: 25

## 2024-01-16 PROCEDURE — 36415 COLL VENOUS BLD VENIPUNCTURE: CPT

## 2024-01-16 PROCEDURE — 85027 COMPLETE CBC AUTOMATED: CPT

## 2024-01-16 PROCEDURE — 99207 PR NO CHARGE NURSE ONLY: CPT

## 2024-01-16 RX ORDER — ACETAMINOPHEN 325 MG/1
325 TABLET ORAL EVERY 6 HOURS PRN
Qty: 100 TABLET | Refills: 0 | Status: SHIPPED | OUTPATIENT
Start: 2024-01-16

## 2024-01-16 RX ORDER — AMLODIPINE BESYLATE 10 MG/1
10 TABLET ORAL DAILY
Qty: 90 TABLET | Refills: 3 | Status: SHIPPED | OUTPATIENT
Start: 2024-01-16

## 2024-01-16 ASSESSMENT — PATIENT HEALTH QUESTIONNAIRE - PHQ9
SUM OF ALL RESPONSES TO PHQ QUESTIONS 1-9: 3
10. IF YOU CHECKED OFF ANY PROBLEMS, HOW DIFFICULT HAVE THESE PROBLEMS MADE IT FOR YOU TO DO YOUR WORK, TAKE CARE OF THINGS AT HOME, OR GET ALONG WITH OTHER PEOPLE: NOT DIFFICULT AT ALL
10. IF YOU CHECKED OFF ANY PROBLEMS, HOW DIFFICULT HAVE THESE PROBLEMS MADE IT FOR YOU TO DO YOUR WORK, TAKE CARE OF THINGS AT HOME, OR GET ALONG WITH OTHER PEOPLE: NOT DIFFICULT AT ALL
SUM OF ALL RESPONSES TO PHQ QUESTIONS 1-9: 3

## 2024-01-16 NOTE — NURSING NOTE
Medicare Wellness Visit  Health Risk Assessment      Hearing Do you feel you have hearing loss?   YES worsening    Cardiovascular: Any chest pain, fast or irregular heart beat, calf pain with walking?      YES yes chronic chest pain once or twice a week, unable to explain more due to memory impairment              Gastrointestinal: Any stomach or stool problems?    YES incontinent of bowel and bladder, wears briefs       Muscles and Joints: Any joint stiffness or soreness?    YES leg and joint pain, pain all day        Nervous System: Any loss of strength or feeling, numbness or tingling, shaking, dizziness, or headache?   YES headaches, takes tylenol which helps    Mental Health: Any depression, anxiety or problems sleeping?     YES troubles sleeping, averages 4-5hours a night    Cognition: Do you have any problems with your memory?   YES worsening since last visit in 2021           Medical Care           Have you been to the ER or overnight in the hospital in the last year?  No          Social History / Home Safety       Marital Status:Single  Who lives in your household? PCA and self    Do you feel threatened or controlled by a partner, ex-partner or anyone in your life? No     Has anyone hurt you physically, for example by pushing, hitting, slapping or kicking you   or forcing you to have sex? No          Does your home have any of the following safety concerns; loose rugs in the hallway,  bathrooms with no grab bars by the tub or toilet, stairs with no handrails or poorly lit areas?  No     Do you need help with dressing yourself, bathing, eating or getting around your home?   YES PCA assists    Do you need help with the phone, transportation, shopping, preparing meals, housework, laundry, medications or managing money? YES Sister helps with meds and money, PCA helps with all others      Risk Behaviors and Healthy Habits     History   Smoking Status    Never   Smokeless Tobacco    Never     How many servings of  fruits and vegetables do you eat a day? Refuses vegetables    Exercise: None No exercise     Do you frequently drive without a seatbelt? No     Do you use tobacco?  No     Do you use any other drugs? No         Do you use alcohol?No      Frailty Assessment            Have you lost 10 or more pounds unintentionally in the previous year?  Possibly per family    How difficult is walking from one room to the other on the same level?severely   Yes     Is it difficult to lift or carry something as heavy as 10 pounds?severely   Yes    Compared with most (men/women) your age, would you say  that you are more active, less active, or about the same? less   Yes          6/7/2019     2:23 PM 8/27/2018     1:08 PM 8/14/2017    10:42 AM   FALL RISK ASSESSMENT   Fallen 2 or more times in the past year? No Yes Yes   Any fall with injury in the past year? No No Yes   Timed Up and Go Test/Seconds (13.5 is a fall risk; contact physician)  32 37.83         Advance Directives:   Discussed with patient and family as appropriate. Has patient  completed advance directives and/or a living will? no      Angela Hutson RN

## 2024-01-16 NOTE — PROGRESS NOTES
SUBJECTIVE:   Ranjeet is a 89 year old, presenting for the following:  Medicare Visit and Hypertension (Been out of BP med for a while now)        1/16/2024    10:11 AM   Additional Questions   Roomed by Mauricio   Accompanied by Family       Are you in the first 12 months of your Medicare coverage?  No    HPI  Here with sister in law and nephew. Hx of severe dementia  Used to use walker to help, but more recently mobility became harder and increasingly hard to walk and more prone to falling per sister in law. She had two falls last year. Patient has PCA and sister in law to help her with ADLs. Currently needs wheelchair. Has been out of her medication for the past two months.      Patient is Incontinent of urine and stool. Family and PCA provide care for her.     Today's PHQ-9 Score:       1/16/2024    10:03 AM   PHQ-9 SCORE   PHQ-9 Total Score MyChart 3 (Minimal depression)   PHQ-9 Total Score 3    3           Have you ever done Advance Care Planning? (For example, a Health Directive, POLST, or a discussion with a medical provider or your loved ones about your wishes): No, advance care planning information given to patient to review.  Patient plans to discuss their wishes with loved ones or provider.         Fall risk  Fall Risk Assessment not completed.severe dementia  Cognitive Screening Not appropriate due to known dementia    Reviewed and updated as needed this visit by clinical staff   Tobacco  Allergies  Meds  Problems  Med Hx  Surg Hx  Fam Hx          Reviewed and updated as needed this visit by Provider   Tobacco  Allergies  Meds  Problems  Med Hx  Surg Hx  Fam Hx          Social History     Tobacco Use    Smoking status: Never    Smokeless tobacco: Never   Substance Use Topics    Alcohol use: No     Alcohol/week: 0.0 standard drinks of alcohol            No data to display                Do you have a current opioid prescription? No  Do you use any other controlled substances or medications that are  "not prescribed by a provider? None    Patient Care Team:  Albaro Nunez MD as PCP - General (Student in organized health care education/training program)  Albaro Nunez MD as Assigned PCP    The following health maintenance items are reviewed in Epic and correct as of today:  Health Maintenance   Topic Date Due    MICROALBUMIN  Never done    DEPRESSION ACTION PLAN  Never done    COVID-19 Vaccine (1) Never done    URINALYSIS  Never done    HEPATITIS A IMMUNIZATION (1 of 2 - Risk 2-dose series) 01/01/1954    RSV VACCINE (Pregnancy & 60+) (1 - 1-dose 60+ series) Never done    HEPATITIS B IMMUNIZATION (2 of 3 - Risk 3-dose series) 01/04/2013    ZOSTER IMMUNIZATION (2 of 3) 09/10/2013    LIPID  04/21/2016    DTAP/TDAP/TD IMMUNIZATION (4 - Td or Tdap) 06/11/2024    PHQ-9  07/16/2024    MEDICARE ANNUAL WELLNESS VISIT  01/16/2025    BMP  01/16/2025    FALL RISK ASSESSMENT  01/16/2025    ADVANCE CARE PLANNING  01/21/2029    INFLUENZA VACCINE  Completed    Pneumococcal Vaccine: 65+ Years  Completed    IPV IMMUNIZATION  Aged Out    HPV IMMUNIZATION  Aged Out    MENINGITIS IMMUNIZATION  Aged Out    RSV MONOCLONAL ANTIBODY  Aged Out     Lab work is in process      Mammogram Screening - Patient over age 75, has elected to discontinue screenings.  Pertinent mammograms are reviewed under the imaging tab.    Review of Systems  Constitutional, HEENT, cardiovascular, pulmonary, GI, , musculoskeletal, neuro, skin, endocrine and psych systems are negative, except as otherwise noted.    OBJECTIVE:   BP (!) 201/75   Pulse 72   Temp 97.4  F (36.3  C)   Resp 22   Ht 1.397 m (4' 7\")   Wt 45.2 kg (99 lb 12 oz)   SpO2 99%   BMI 23.18 kg/m   Estimated body mass index is 23.18 kg/m  as calculated from the following:    Height as of this encounter: 1.397 m (4' 7\").    Weight as of this encounter: 45.2 kg (99 lb 12 oz).  Physical Exam  GENERAL: alert and no distress  EYES: Eyes grossly normal to inspection, PERRL and conjunctivae and " sclerae normal  RESP: lungs clear to auscultation - no rales, rhonchi or wheezes  CV: normal S1 S2, no S3 or S4, no murmur  ABDOMEN: soft, nontender, no masses and bowel sounds normal  MS: no gross musculoskeletal defects noted, no edema  SKIN: no suspicious lesions or rashes  NEURO: Normal strength and tone, mentation intact and speech normal  PSYCH:flat affect, forgetful, baseline confusion unable to answer most questions.        ASSESSMENT / PLAN:   (Z00.00) Encounter for Medicare annual wellness exam  (primary encounter diagnosis)  Comment: here for preventive visit, has not been seen in the past three years. Here with sister-in-law/caretaker, she notes decreased mobility and slowly being unable to walk without falling. Harder to use the walker lately. Also dementia worsening over the past few years. Currently only able to answer some simple questions but mostly forgetful and confused at baseline. Will order wheelchair today.  Plan: BMP and CBC           Refill medications            Flu vaccine given    (N18.9) Chronic renal failure  Comment: no labs for the past few years, will get BMP and CBC today.  Plan: Basic Metabolic Panel, CBC with platelets, C         SIGN LANG/ORAL             (I10) Benign essential hypertension  Comment: BP elevated today in clinic. Patient reports she ran out of her medications for the past two months. Will follow up virtually or in person within two weeks to follow up on BP.   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   Plan: acetaminophen (TYLENOL) 325 MG tablet            (E87.6) Hypokalemia  Comment: was on potassium supplement for past few years. Will order labs prior to refilling. Lab showed low potassium level. Will refill K+ tabs   Plan: potassium chloride ER (KLOR-CON) 20 MEQ CR         tablet           (R26.9) Abnormality of gait and  mobility  Comment: slowly lost ability to walk with walker and became more frail. Will order wheelchair.  1) Patient has limited mobility due to dementia and more recent falls. Is receiving PCA to help with MRADLs currently.   2) Patient has used a walker in the past however currently is having more problems with the walker and is still falling more often.   3) The home provides adequate access between the rooms for maneuvering a wheelchair.   4) The wheelchair is significantly improve the patient ability to participate in MRADLs and will use it on a daily basis. The wheelchair is decrease the patients fall which will help prevent injury. The wheelchair will be used to take patient to and from doctor and specialist appointments.   5) The patient is willing to use the wheelchair for her safety with moving around the home.   6) The patient will not be the one moving the wheelchair most of the time. The patient has dementia and her PCA or family members will be pushing her around.   7) Has a PCA and family members who will be providing assistance with the wheelchair.   8) Ordering a standard wheelchair will be the best fit for the patients needs.   9) Ordering the standard seat cushion and elevating leg rests as patient has osteoarthritis of the lower legs along with her dementia. Elevating her legs will help prevent then from catching on the floor and the cushion will allow her to be more comfortable.   Plan: Wheelchair Order for DME - ONLY FOR DME           COUNSELING:  Reviewed preventive health counseling, as reflected in patient instructions        She reports that she has never smoked. She has never used smokeless tobacco.      Appropriate preventive services were discussed with this patient, including applicable screening as appropriate for fall prevention, nutrition, physical activity, Tobacco-use cessation, weight loss and cognition.  Checklist reviewing preventive services available has been given to the  patient.    Reviewed patients plan of care and provided an AVS. The Basic Care Plan (routine screening as documented in Health Maintenance) for Ku meets the Care Plan requirement. This Care Plan has been established and reviewed with the Patient and caregiver.          CYRIL Medina PGY2  [facility]    Identified Health Risks:  I have reviewed Opioid Use Disorder and Substance Use Disorder risk factors and made any needed referrals.   She is at risk for falling and has been provided with information to reduce the risk of falling at home.  Answers submitted by the patient for this visit:  Patient Health Questionnaire (Submitted on 1/16/2024)  If you checked off any problems, how difficult have these problems made it for you to do your work, take care of things at home, or get along with other people?: Not difficult at all  PHQ9 TOTAL SCORE: 3    She is at risk for falling and has been provided with information to reduce the risk of falling at home.

## 2024-01-17 LAB
ANION GAP SERPL CALCULATED.3IONS-SCNC: 11 MMOL/L (ref 7–15)
BUN SERPL-MCNC: 12.9 MG/DL (ref 8–23)
CALCIUM SERPL-MCNC: 8.8 MG/DL (ref 8.8–10.2)
CHLORIDE SERPL-SCNC: 107 MMOL/L (ref 98–107)
CREAT SERPL-MCNC: 0.75 MG/DL (ref 0.51–0.95)
DEPRECATED HCO3 PLAS-SCNC: 27 MMOL/L (ref 22–29)
EGFRCR SERPLBLD CKD-EPI 2021: 76 ML/MIN/1.73M2
GLUCOSE SERPL-MCNC: 141 MG/DL (ref 70–99)
POTASSIUM SERPL-SCNC: 3.2 MMOL/L (ref 3.4–5.3)
SODIUM SERPL-SCNC: 145 MMOL/L (ref 135–145)

## 2024-01-18 DIAGNOSIS — E87.6 HYPOKALEMIA: ICD-10-CM

## 2024-01-18 RX ORDER — POTASSIUM CHLORIDE 1500 MG/1
20 TABLET, EXTENDED RELEASE ORAL 2 TIMES DAILY
Qty: 60 TABLET | Refills: 0 | Status: SHIPPED | OUTPATIENT
Start: 2024-01-18 | End: 2024-03-26

## 2024-01-22 RX ORDER — POTASSIUM CHLORIDE 1500 MG/1
20 TABLET, EXTENDED RELEASE ORAL 2 TIMES DAILY
Qty: 60 TABLET | OUTPATIENT
Start: 2024-01-22

## 2024-02-02 ENCOUNTER — HOSPITAL ENCOUNTER (EMERGENCY)
Facility: HOSPITAL | Age: 89
Discharge: HOME | End: 2024-02-02
Attending: EMERGENCY MEDICINE | Admitting: EMERGENCY MEDICINE
Payer: COMMERCIAL

## 2024-02-02 ENCOUNTER — OFFICE VISIT (OUTPATIENT)
Dept: FAMILY MEDICINE | Facility: CLINIC | Age: 89
End: 2024-02-02
Payer: COMMERCIAL

## 2024-02-02 VITALS
SYSTOLIC BLOOD PRESSURE: 105 MMHG | OXYGEN SATURATION: 95 % | TEMPERATURE: 101.7 F | DIASTOLIC BLOOD PRESSURE: 58 MMHG | RESPIRATION RATE: 24 BRPM | HEART RATE: 73 BPM

## 2024-02-02 VITALS
RESPIRATION RATE: 20 BRPM | SYSTOLIC BLOOD PRESSURE: 124 MMHG | HEART RATE: 75 BPM | WEIGHT: 99 LBS | BODY MASS INDEX: 23.01 KG/M2 | DIASTOLIC BLOOD PRESSURE: 60 MMHG | TEMPERATURE: 99.7 F | OXYGEN SATURATION: 98 %

## 2024-02-02 DIAGNOSIS — R50.9 FEVER, UNSPECIFIED FEVER CAUSE: Primary | ICD-10-CM

## 2024-02-02 DIAGNOSIS — N39.0 URINARY TRACT INFECTION IN FEMALE: ICD-10-CM

## 2024-02-02 DIAGNOSIS — I10 BENIGN ESSENTIAL HYPERTENSION: ICD-10-CM

## 2024-02-02 DIAGNOSIS — J10.1 INFLUENZA A: ICD-10-CM

## 2024-02-02 LAB
ALBUMIN UR-MCNC: 50 MG/DL
ANION GAP SERPL CALCULATED.3IONS-SCNC: 15 MMOL/L (ref 7–15)
APPEARANCE UR: ABNORMAL
BACTERIA #/AREA URNS HPF: ABNORMAL /HPF
BASOPHILS # BLD AUTO: 0 10E3/UL (ref 0–0.2)
BASOPHILS NFR BLD AUTO: 0 %
BILIRUB UR QL STRIP: NEGATIVE
BUN SERPL-MCNC: 16.7 MG/DL (ref 8–23)
CALCIUM SERPL-MCNC: 8.6 MG/DL (ref 8.8–10.2)
CHLORIDE SERPL-SCNC: 102 MMOL/L (ref 98–107)
COLOR UR AUTO: YELLOW
CREAT SERPL-MCNC: 0.98 MG/DL (ref 0.51–0.95)
DEPRECATED HCO3 PLAS-SCNC: 23 MMOL/L (ref 22–29)
EGFRCR SERPLBLD CKD-EPI 2021: 55 ML/MIN/1.73M2
EOSINOPHIL # BLD AUTO: 0 10E3/UL (ref 0–0.7)
EOSINOPHIL NFR BLD AUTO: 0 %
ERYTHROCYTE [DISTWIDTH] IN BLOOD BY AUTOMATED COUNT: 13.5 % (ref 10–15)
FLUAV RNA SPEC QL NAA+PROBE: POSITIVE
FLUBV RNA RESP QL NAA+PROBE: NEGATIVE
GLUCOSE SERPL-MCNC: 136 MG/DL (ref 70–99)
GLUCOSE UR STRIP-MCNC: NEGATIVE MG/DL
HCT VFR BLD AUTO: 40.2 % (ref 35–47)
HGB BLD-MCNC: 13.2 G/DL (ref 11.7–15.7)
HGB UR QL STRIP: ABNORMAL
HOLD SPECIMEN: NORMAL
HOLD SPECIMEN: NORMAL
IMM GRANULOCYTES # BLD: 0 10E3/UL
IMM GRANULOCYTES NFR BLD: 0 %
KETONES UR STRIP-MCNC: NEGATIVE MG/DL
LEUKOCYTE ESTERASE UR QL STRIP: ABNORMAL
LYMPHOCYTES # BLD AUTO: 0.8 10E3/UL (ref 0.8–5.3)
LYMPHOCYTES NFR BLD AUTO: 11 %
MCH RBC QN AUTO: 30.6 PG (ref 26.5–33)
MCHC RBC AUTO-ENTMCNC: 32.8 G/DL (ref 31.5–36.5)
MCV RBC AUTO: 93 FL (ref 78–100)
MONOCYTES # BLD AUTO: 0.6 10E3/UL (ref 0–1.3)
MONOCYTES NFR BLD AUTO: 8 %
MUCOUS THREADS #/AREA URNS LPF: PRESENT /LPF
NEUTROPHILS # BLD AUTO: 5.8 10E3/UL (ref 1.6–8.3)
NEUTROPHILS NFR BLD AUTO: 81 %
NITRATE UR QL: NEGATIVE
NRBC # BLD AUTO: 0 10E3/UL
NRBC BLD AUTO-RTO: 0 /100
PH UR STRIP: 5.5 [PH] (ref 5–7)
PLATELET # BLD AUTO: 240 10E3/UL (ref 150–450)
POTASSIUM SERPL-SCNC: 3.8 MMOL/L (ref 3.4–5.3)
RBC # BLD AUTO: 4.31 10E6/UL (ref 3.8–5.2)
RBC URINE: 6 /HPF
RSV RNA SPEC NAA+PROBE: NEGATIVE
SARS-COV-2 RNA RESP QL NAA+PROBE: NEGATIVE
SODIUM SERPL-SCNC: 140 MMOL/L (ref 135–145)
SP GR UR STRIP: 1.02 (ref 1–1.03)
SQUAMOUS EPITHELIAL: 10 /HPF
TRANSITIONAL EPI: 1 /HPF
UROBILINOGEN UR STRIP-MCNC: <2 MG/DL
WBC # BLD AUTO: 7.1 10E3/UL (ref 4–11)
WBC CLUMPS #/AREA URNS HPF: PRESENT /HPF
WBC URINE: >182 /HPF

## 2024-02-02 PROCEDURE — 36415 COLL VENOUS BLD VENIPUNCTURE: CPT | Performed by: EMERGENCY MEDICINE

## 2024-02-02 PROCEDURE — 99283 EMERGENCY DEPT VISIT LOW MDM: CPT

## 2024-02-02 PROCEDURE — 99215 OFFICE O/P EST HI 40 MIN: CPT | Mod: GC

## 2024-02-02 PROCEDURE — 87637 SARSCOV2&INF A&B&RSV AMP PRB: CPT | Performed by: EMERGENCY MEDICINE

## 2024-02-02 PROCEDURE — 87040 BLOOD CULTURE FOR BACTERIA: CPT | Performed by: EMERGENCY MEDICINE

## 2024-02-02 PROCEDURE — 87086 URINE CULTURE/COLONY COUNT: CPT | Performed by: EMERGENCY MEDICINE

## 2024-02-02 PROCEDURE — 80048 BASIC METABOLIC PNL TOTAL CA: CPT | Performed by: EMERGENCY MEDICINE

## 2024-02-02 PROCEDURE — 81001 URINALYSIS AUTO W/SCOPE: CPT | Performed by: EMERGENCY MEDICINE

## 2024-02-02 PROCEDURE — 250N000013 HC RX MED GY IP 250 OP 250 PS 637: Performed by: EMERGENCY MEDICINE

## 2024-02-02 PROCEDURE — 87186 SC STD MICRODIL/AGAR DIL: CPT | Performed by: EMERGENCY MEDICINE

## 2024-02-02 PROCEDURE — 85025 COMPLETE CBC W/AUTO DIFF WBC: CPT | Performed by: EMERGENCY MEDICINE

## 2024-02-02 RX ORDER — OSELTAMIVIR PHOSPHATE 30 MG/1
30 CAPSULE ORAL DAILY
Qty: 5 CAPSULE | Refills: 0 | Status: SHIPPED | OUTPATIENT
Start: 2024-02-02 | End: 2024-02-07

## 2024-02-02 RX ORDER — CEPHALEXIN 500 MG/1
500 CAPSULE ORAL ONCE
Status: COMPLETED | OUTPATIENT
Start: 2024-02-02 | End: 2024-02-02

## 2024-02-02 RX ORDER — CEPHALEXIN 500 MG/1
500 CAPSULE ORAL 2 TIMES DAILY
Qty: 14 CAPSULE | Refills: 0 | Status: SHIPPED | OUTPATIENT
Start: 2024-02-02 | End: 2024-02-09

## 2024-02-02 RX ADMIN — CEPHALEXIN 500 MG: 500 CAPSULE ORAL at 19:22

## 2024-02-02 ASSESSMENT — ACTIVITIES OF DAILY LIVING (ADL): ADLS_ACUITY_SCORE: 35

## 2024-02-02 NOTE — PROGRESS NOTES
Preceptor Attestation:  Patient's case reviewed and discussed with the resident, Albaro Nunez MD, and I personally evaluated the patient. I agree with written assessment and plan of care.    Supervising Physician:  Fouzia Brand MD   [facility]

## 2024-02-02 NOTE — ED NOTES
Expected Patient Referral to ED  3:09 PM    Referring Clinic/Provider:  [facility]    Reason for referral/Clinical facts:  In clinic is febrile with blood pressure of 102 systolic. Patient has dementia but family denies any new symptoms. Provider in clinic most concerned for UTI.     Recommendations provided:  Send to ED for further evaluation    Caller was informed that this institution does possess the capabilities and/or resources to provide for patient and should be transferred to our facility.    Discussed that if direct admit is sought and any hurdles are encountered, this ED would be happy to see the patient and evaluate.    Informed caller that recommendations provided are recommendations based only on the facts provided and that they responsible to accept or reject the advice, or to seek a formal in person consultation as needed and that this ED will see/treat patient should they arrive.      ANGELO TOUSSAINT MD  [facility] EMERGENCY DEPARTMENT  [address] [place] [id number]  [phone]       Angelo Toussaint MD  02/02/24 0128

## 2024-02-02 NOTE — ED TRIAGE NOTES
Patient presents here as a referral from her clinic for further evaluation of a fever. The patient is not able to express her symptoms due to confusion. The staff were not able to obtain a urine sample due to patient's limited ROM and mobility. Patient does use a cane for ambulation at home.

## 2024-02-02 NOTE — PROGRESS NOTES
"  Assessment & Plan     (R50.9) Fever, unspecified fever cause  (primary encounter diagnosis)  Comment: History of severe dementia, here with her caretaker. Patient is febrile and fatigued in clinic. Temp 101.7, family notes no URI symptoms, no urinary or GI symptoms. No sick contacts. Patient is incontinent of urine and stool, and has poor mobility. Will need urine sample with cath. Also concern about BP that is soft, patient's BP typically runs in the 160s-200 systolic. This morning per family 160 SBP. Concern for sepsis at this point given no symptoms of URI, lungs clear, no localizing symptoms. Would like to rule out urosepsis as above.  Plan: send patient to ED given soft BP and high grade fever. Unable to collect UA in clinic.      (I10) Benign essential hypertension  Comment: BP usually above goal. Son reports that at home BP had been 150-160 range. But here in clinic BP is soft and febrile as above.   Plan: patient is going to the ER. Instructed family to continue once patient is back home.             Will continue follow up and adjust meds as needed  No follow-ups on file.    Subjective   Ranjeet is a 89 year old, presenting for the following health issues:  RECHECK (Check bp)      2/2/2024     2:09 PM   Additional Questions   Roomed by carl benjamin/ghulam   Accompanied by nephew     KATIE Pollack history of dementia here with   Here for BP follow up   Last visit was a physical and  systolic   Today normal 102/62 \"soft\"  Temp is 100.7 today repeat 101F  No URI symptoms  Here with nephew one of main caretakers   Family didn't notice anything different in her behavior or eating      BP at home. Family only recorded systolic pressures   130-214  She is currently taking amlodipine 10 mg daily        Review of Systems  Constitutional, HEENT, cardiovascular, pulmonary, gi and gu systems are negative, except as otherwise noted.      Objective    /62   Pulse 80   Temp (!) 100.7  F (38.2  C)   Resp 24   SpO2 97% "   There is no height or weight on file to calculate BMI.  Physical Exam   GENERAL: alert and no distress  RESP: lungs clear to auscultation - no rales, rhonchi or wheezes  CV:  normal S1 S2, no murmur, click or rub, no peripheral edema  ABDOMEN: soft, nontender, no suprapubic tenderness. no masses and bowel sounds normal  MS: no gross musculoskeletal defects noted, no edema            Signed Electronically by: CYRIL Medina PGY2

## 2024-02-03 NOTE — DISCHARGE INSTRUCTIONS
Please follow-up with your Primary Care Provider next week for a recheck; call to arrange appointment.    Please have your primary care provider recheck your kidney function, as it was a little lower than normal today.     Return to the ER for shortness of breath, abdominal pain, back / flank pain, persistent nausea / vomiting, inability to empty your bladder, persistent fevers or other concerns.    Complete the full course of antibiotics for your urinary tract infection, unless otherwise guided by the results of your urine culture.

## 2024-02-03 NOTE — ED PROVIDER NOTES
Emergency Department Encounter     Evaluation Date & Time:   2024  6:37 PM    CHIEF COMPLAINT:  Fever      Triage Note:Patient presents here as a referral from her clinic for further evaluation of a fever. The patient is not able to express her symptoms due to confusion. The staff were not able to obtain a urine sample due to patient's limited ROM and mobility. Patient does use a cane for ambulation at home.               Impression and Plan       FINAL IMPRESSION:    ICD-10-CM    1. Urinary tract infection in female  N39.0       2. Influenza A  J10.1             ED COURSE & MEDICAL DECISION MAKIN:53 PM I met with the patient to obtain patient history and performed a physical exam. Discussed plan for ED work up including potential diagnostic studies and interventions.  7:13 PM Reevaluated and updated the patient with findings. We discussed the plan for discharge and the patient is agreeable. Reviewed supportive cares, symptomatic treatment, outpatient follow up, and reasons to return to the Emergency Department. Patient to be discharged by ED RN.     89 year old female, history of dementia, HTN and CKD3, who presents with family for evaluation of fever that started today. She presented to clinic, however they referred her to the ED for evaluation of possible UTI.    Patient denies abdominal pain, back pain, nausea / vomiting. No URI symptoms. Family report that she has been eating and behaving normally.     On exam, abdomen is benign with no CVAT, BL. I do not suspect pyelonephritis or associated ureteral stone and do not think emergent imaging is indicated.    Her Influenza A test returned positive. Given onset of fever today and advanced age, will treat with Tamiflu (renally dosed).    UA contaminated (10 epi cells), however suggestive of infection with 500 LE, few bacteria, >182 WBCs and WBC clumps. Urine culture pending and patient given a dose of po Keflex (discussed dosing with the ED  Pharmacist).    Labs otherwise remarkable for no leukocytosis (WBC 7.1) or anemia (Hb 13.2). She has mild renal insufficiency (creatinine 0.98, GFR 55) with no significant electrolyte derangements.     Evaluation is reassuring and I do not think admission is indicated.    Patient discharged to home with follow-up with her PCP next week for a recheck.  She was given a prescription for Tamiflu and Keflex (given the first dose po in the ED; both renally dosed).  Return precautions provided.  Patient stable throughout ED course.      At the conclusion of the encounter I discussed the results of all the tests and the disposition. The questions were answered. The patient and family acknowledged understanding and were agreeable with the care plan.    Medical Decision Making  Obtained supplemental history:Supplemental history obtained?: Family Member/Significant Other  Reviewed external records: External records reviewed?: Documented in chart  Care impacted by chronic illness:Chronic Kidney Disease and Hypertension  Care significantly affected by social determinants of health:N/A  Did you consider but not order tests?: Work up considered but not performed and documented in chart, if applicable  Did you interpret images independently?: Independent interpretation of ECG and images noted in documentation, when applicable.  Consultation discussion with other provider: ED Pharmacist  Discharge. I prescribed additional prescription strength medication(s) as charted. I considered admission, but ultimately discharged patient given reassuring evaluation.    MEDICATIONS GIVEN IN THE EMERGENCY DEPARTMENT:  Medications   cephALEXin (KEFLEX) capsule 500 mg (has no administration in time range)       NEW PRESCRIPTIONS STARTED AT TODAY'S ED VISIT:  New Prescriptions    CEPHALEXIN (KEFLEX) 500 MG CAPSULE    Take 1 capsule (500 mg) by mouth 2 times daily for 7 days    OSELTAMIVIR (TAMIFLU) 30 MG CAPSULE    Take 1 capsule (30 mg) by mouth  daily for 5 days       HPI     HPI     Secondary to language barrier, history was obtained with assistance from the patient's niece; they declined professional Cris .    Ranjeet Toure is an 89 year old female, history of dementia, HTN and CKD3, who presents to this ED via walk-in with family for evaluation of fever.    Per niece, patient was seen in clinic at 2 PM today for fever that started today. She was advised by clinic to present to the ED for concerns of a UTI. Patient denies associated abdominal pain, back pain, nausea / vomiting.     She otherwise denies URI symptoms; no cough, rhinorrhea or body aches.     She has otherwise been in her usual state of health and denies chest pain, shortness of breath. Has been behaving and eating normally.         Medical History     Past Medical History:   Diagnosis Date    Benign essential hypertension 7/22/2015    CKD (chronic kidney disease) stage 3, GFR 30-59 ml/min (H) 10/5/2017       History reviewed. No pertinent surgical history.    Family History   Problem Relation Age of Onset    Diabetes No family hx of     Coronary Artery Disease No family hx of     Hypertension No family hx of     Hyperlipidemia No family hx of     Breast Cancer No family hx of     Cancer - colorectal No family hx of     Ovarian Cancer No family hx of     Prostate Cancer No family hx of     Other Cancer No family hx of     Mental Illness No family hx of     Cerebrovascular Disease No family hx of     Anesthesia Reaction No family hx of     Asthma No family hx of     Osteoporosis No family hx of     Known Genetic Syndrome No family hx of     Obesity No family hx of     Unknown/Adopted No family hx of     Colon Cancer No family hx of     Depression No family hx of     Anxiety Disorder No family hx of     Mental Illness No family hx of     Substance Abuse No family hx of     Genetic Disorder No family hx of     Thyroid Disease No family hx of        Social History     Tobacco Use    Smoking  status: Never    Smokeless tobacco: Never   Substance Use Topics    Alcohol use: No     Alcohol/week: 0.0 standard drinks of alcohol    Drug use: No       cephALEXin (KEFLEX) 500 MG capsule  oseltamivir (TAMIFLU) 30 MG capsule  acetaminophen (TYLENOL) 325 MG tablet  acetaminophen (TYLENOL) 500 MG tablet  albuterol (PROAIR HFA/PROVENTIL HFA/VENTOLIN HFA) 108 (90 Base) MCG/ACT inhaler  alendronate (FOSAMAX) 70 MG tablet  amLODIPine (NORVASC) 10 MG tablet  dextran 70-hypromellose (TEARS NATURALE FREE PF) 0.1-0.3 % ophthalmic solution  hypromellose-dextran (HYPROMELLOSE-DEXTRAN 0.3-0.1%) 0.1-0.3 % ophthalmic solution  order for DME  potassium chloride ER (KLOR-CON) 20 MEQ CR tablet  Spacer/Aero-Holding Chambers (AEROCHAMBER Z-STAT PLUS CHAMBR) MISC        Physical Exam     First Vitals:  Patient Vitals for the past 24 hrs:   BP Temp Temp src Pulse Resp SpO2 Weight   02/02/24 1845 124/60 99.7  F (37.6  C) Oral 75 -- 98 % --   02/02/24 1534 (!) 140/63 100.2  F (37.9  C) Temporal 79 20 93 % 44.9 kg (99 lb)       PHYSICAL EXAM:   Physical Exam    GENERAL: Awake, alert.  In no acute distress.   HEENT: Normocephalic, atraumatic.   NECK: No stridor.  PULMONARY: Symmetrical breath sounds without distress.  Lungs clear to auscultation bilaterally without wheezes, rhonchi or rales.  CARDIO: Regular rate and rhythm.  No significant murmur, rub or gallop.  Radial pulses strong and symmetrical.  ABDOMINAL: Abdomen soft, non-distended and non-tender to palpation.  No CVAT, BL.  EXTREMITIES: No lower extremity swelling or edema.      NEURO: Alert and oriented to person.  Cranial nerves grossly intact.  No focal motor deficit.  PSYCH: Normal mood and affect.    Results     LAB:  All pertinent labs reviewed and interpreted  Labs Ordered and Resulted from Time of ED Arrival to Time of ED Departure   ROUTINE UA WITH MICROSCOPIC REFLEX TO CULTURE - Abnormal       Result Value    Color Urine Yellow      Appearance Urine Turbid (*)      Glucose Urine Negative      Bilirubin Urine Negative      Ketones Urine Negative      Specific Gravity Urine 1.021      Blood Urine 0.06 mg/dL (*)     pH Urine 5.5      Protein Albumin Urine 50 (*)     Urobilinogen Urine <2.0      Nitrite Urine Negative      Leukocyte Esterase Urine 500 [place]/uL (*)     Bacteria Urine Few (*)     WBC Clumps Urine Present (*)     Mucus Urine Present (*)     RBC Urine 6 (*)     WBC Urine >182 (*)     Squamous Epithelials Urine 10 (*)     Transitional Epithelials Urine 1     BASIC METABOLIC PANEL - Abnormal    Sodium 140      Potassium 3.8      Chloride 102      Carbon Dioxide (CO2) 23      Anion Gap 15      Urea Nitrogen 16.7      Creatinine 0.98 (*)     GFR Estimate 55 (*)     Calcium 8.6 (*)     Glucose 136 (*)    INFLUENZA A/B, RSV, & SARS-COV2 PCR - Abnormal    Influenza A PCR Positive (*)     Influenza B PCR Negative      RSV PCR Negative      SARS CoV2 PCR Negative     CBC WITH PLATELETS AND DIFFERENTIAL    WBC Count 7.1      RBC Count 4.31      Hemoglobin 13.2      Hematocrit 40.2      MCV 93      MCH 30.6      MCHC 32.8      RDW 13.5      Platelet Count 240      % Neutrophils 81      % Lymphocytes 11      % Monocytes 8      % Eosinophils 0      % Basophils 0      % Immature Granulocytes 0      NRBCs per 100 WBC 0      Absolute Neutrophils 5.8      Absolute Lymphocytes 0.8      Absolute Monocytes 0.6      Absolute Eosinophils 0.0      Absolute Basophils 0.0      Absolute Immature Granulocytes 0.0      Absolute NRBCs 0.0     BLOOD CULTURE   BLOOD CULTURE   URINE CULTURE         I, Tory Vaughn, am serving as a scribe to document services personally performed by Dorota Heart MD based on my observation and the provider's statements to me. I, Dorota Heart MD attest that Tory Vaughn is acting in a scribe capacity, has observed my performance of the services and has documented them in accordance with my direction.    Dorota Heart MD  Emergency Medicine  [facility]  [facility] EMERGENCY DEPARTMENT           Dorota Heart MD  02/03/24 8027

## 2024-02-05 ENCOUNTER — PATIENT OUTREACH (OUTPATIENT)
Dept: CARE COORDINATION | Facility: CLINIC | Age: 89
End: 2024-02-05
Payer: COMMERCIAL

## 2024-02-05 LAB — BACTERIA UR CULT: ABNORMAL

## 2024-02-05 NOTE — PROGRESS NOTES
Clinic Care Coordination Contact  Follow Up Progress Note      Assessment: The pt was recently in the ED, I called to check up on the pt, and help the pt setup a ED follow up. The pt was at [facility] for a fever. I called and talked to the pt sister. She stated that the pt is doing better. She did not feel that the pt needs a follow up.    Care Gaps:    Health Maintenance Due   Topic Date Due    MICROALBUMIN  Never done    DEPRESSION ACTION PLAN  Never done    COVID-19 Vaccine (1) Never done    HEPATITIS A IMMUNIZATION (1 of 2 - Risk 2-dose series) 01/01/1954    RSV VACCINE (Pregnancy & 60+) (1 - 1-dose 60+ series) Never done    HEPATITIS B IMMUNIZATION (2 of 3 - Risk 3-dose series) 01/04/2013    ZOSTER IMMUNIZATION (2 of 3) 09/10/2013    LIPID  04/21/2016           Care Plans      Intervention/Education provided during outreach:               Plan:     Care Coordinator will follow up in

## 2024-02-06 ENCOUNTER — TELEPHONE (OUTPATIENT)
Dept: EMERGENCY MEDICINE | Facility: HOSPITAL | Age: 89
End: 2024-02-06
Payer: COMMERCIAL

## 2024-02-06 DIAGNOSIS — N39.0 URINARY TRACT INFECTION: Primary | ICD-10-CM

## 2024-02-06 RX ORDER — CEFPODOXIME PROXETIL 200 MG/1
200 TABLET, FILM COATED ORAL DAILY
Qty: 5 TABLET | Refills: 0 | Status: SHIPPED | OUTPATIENT
Start: 2024-02-06 | End: 2024-02-06

## 2024-02-06 RX ORDER — CEFDINIR 300 MG/1
300 CAPSULE ORAL DAILY
Qty: 5 CAPSULE | Refills: 0 | Status: SHIPPED | OUTPATIENT
Start: 2024-02-06 | End: 2024-02-11

## 2024-02-06 NOTE — TELEPHONE ENCOUNTER
[facility]    Reason for call: Lab Result Notification     Lab Result (including Rx patient on, if applicable).  If culture, copy of lab report at bottom.  Lab Result: Final Urine Culture Report on 2/5/24  [facility] Emergency Dept discharge antibiotic prescribed: Cephalexin (Keflex) 500 mg capsule, 1 capsule (500 mg) by mouth 2 times daily for 7 days.   Date of Rx (if applicable):  2/2/24     #1. Bacteria, >100,000 CFU/ML Klebsiella pneumoniae ,  is RESISTANT to antibiotic.  Recommendations in treatment per [facility] ED lab result Urine Culture protocol.    Creatinine Level (mg/dl)   Creatinine   Date Value Ref Range Status   02/02/2024 0.98 (H) 0.51 - 0.95 mg/dL Final   06/29/2020 1.03 0.60 - 1.10 mg/dL Final    Creatinine clearance (ml/min), if applicable    Serum creatinine: 0.98 mg/dL (H) 02/02/24 1623  Estimated creatinine clearance: 27.6 mL/min (A)     Patient's current Symptoms:   Spoke with Pts sister who has a release of information on file. Pt's confusion has cleared since ED visit and she is no longer having any fevers. Relayed result and recommendations, New RX sent to pharmacy of choice     RN Recommendations/Instructions per Portal ED lab result protocol:   [facility] ED lab result protocol utilized: Urine Cx  Advise to discontinue current antibiotic.   Instruct to start antibiotic, sent to preferred pharmacy.     Patient/care giver notified to contact your PCP clinic or return to the Emergency department if your:  Symptoms return.  Symptoms do not improve after 3 days on antibiotic.  Symptoms do not resolve after completing antibiotic.  Symptoms worsen or other concerning symptoms.    Wicho Suarez RN

## 2024-02-06 NOTE — TELEPHONE ENCOUNTER
[facility] Emergency Department/Urgent Care Lab result notification  [Note:  ED Lab Results RN will reference the [facility] Emergency Dept visit note prior to contacting patient AND/OR prior to consulting Emergency Dept Provider.  Highlights of Emergency Dept visit in information summary at the bottom of this telephone note]    1. Reason for call  Pharmacy calling.  Rx for Cefpodoxime is not covered by the patients insurance    2. Lab Result (including Rx patient on, if applicable).  If culture, copy of lab report at bottom.  Lab Result: Final Urine Culture Report on 2/5/24  [facility] Emergency Dept discharge antibiotic prescribed: Cephalexin (Keflex) 500 mg capsule, 1 capsule (500 mg) by mouth 2 times daily for 7 days.   Date of Rx (if applicable):  2/2/24     #1. Bacteria, >100,000 CFU/ML Klebsiella pneumoniae ,  is RESISTANT to antibiotic.  Recommendations in treatment per [facility] ED lab result Urine Culture protocol.         4. RN Recommendations/Instructions per [facility] ED lab result protocol  [facility] ED lab result protocol used: Urine culture  Discontinue the Cefpodoxime Rx and switch to Cefdinir (Omnicef) 300 mg capsule, 1 capsule (300 mg) by mouth daily for 5 days (cr cl is 27.6 ml/min)      Information summary from Emergency Dept/Urgent Care visit on 2/2/24  Allergies Allergies   Allergen Reactions    No Known Allergies       Weight, if applicable Wt Readings from Last 2 Encounters:   02/02/24 44.9 kg (99 lb)   01/16/24 45.2 kg (99 lb 12 oz)      Creatinine Level (mg/dl) Creatinine   Date Value Ref Range Status   02/02/2024 0.98 (H) 0.51 - 0.95 mg/dL Final   06/29/2020 1.03 0.60 - 1.10 mg/dL Final      Creatinine clearance (ml/min), if applicable Serum creatinine: 0.98 mg/dL (H) 02/02/24 1623  Estimated creatinine clearance: 27.6 mL/min (A)   ED/UC diagnosis Urinary tract infection in female    Influenza A   ED/UC Provider Dorota Heart MD          Copy of Lab report (if applicable)        Galindo Lopez RN  [facility]  Emergency Dept Lab Result RN  Ph# [phone]

## 2024-02-07 LAB — BACTERIA BLD CULT: NO GROWTH

## 2024-02-14 ENCOUNTER — MEDICAL CORRESPONDENCE (OUTPATIENT)
Dept: HEALTH INFORMATION MANAGEMENT | Facility: CLINIC | Age: 89
End: 2024-02-14
Payer: COMMERCIAL

## 2024-03-01 NOTE — TELEPHONE ENCOUNTER
Received a voicemail in regards to addending the 12/8 visit note they need more clarification of cause and region of the back pain. They cannot start services till this is completed.     Let me know when you have completed this.    Yes

## 2024-03-20 NOTE — PROGRESS NOTES
HPI:       Ranjeet Toure is a 83 year old  female who presents to address the following concerns:    Follow up chest pain and blood pressure:  -denies chest pain today  -reviewed documentation from last visit as well as EKG  EKG signifcant for 1st degree heart block as well as non specific t-wave changes  -was started on amlodipine for blood pressure    Started amlodipine at last visit.    Reports that she took the medication today  Other medication was losartan  No issues with medication    Denies SOB, chest pain, other changes.  Feeling in typical state of health             PMHX:     Patient Active Problem List   Diagnosis     Benign essential hypertension     Chronic hepatitis B virus infection (H)     Tear film insufficiency     Hearing loss     Osteoarthrosis involving lower leg       Current Outpatient Prescriptions   Medication Sig Dispense Refill     amLODIPine (NORVASC) 5 MG tablet Take 1 tablet (5 mg) by mouth daily 30 tablet 0     losartan (COZAAR) 100 MG tablet Take 1 tablet (100 mg) by mouth daily 90 tablet 1     acetaminophen (TYLENOL) 500 MG tablet Take 2 tablets (1,000 mg) by mouth 2 times daily as needed for mild pain 100 tablet 3     albuterol (PROAIR HFA/PROVENTIL HFA/VENTOLIN HFA) 108 (90 BASE) MCG/ACT Inhaler Inhale 2 puffs into the lungs every 4 hours as needed       glycerin, adult, ([employer] GLYCERIN ADULT) 2 g SUPP Suppository Place 1 suppository rectally daily as needed for constipation 1 suppository 0     hypromellose-dextran 0.3-0.1% (ARTIFICIAL TEARS) opthalmic solution Apply 1-2 drops to eye 4 times daily as needed       order for DME Equipment being ordered: Blood pressure cuff and monitor - automatic. 1 Units 0          Allergies   Allergen Reactions     No Known Allergies        No results found for this or any previous visit (from the past 24 hour(s)).    Current Outpatient Prescriptions   Medication     amLODIPine (NORVASC) 5 MG tablet     losartan (COZAAR) 100 MG tablet        Problem: At Risk for Falls  Goal: Patient does not fall  Outcome: Monitoring/Evaluating progress  Goal: Patient takes action to control fall-related risks  Outcome: Monitoring/Evaluating progress     Problem: Stroke: Hemorrhagic  Goal: Neurological status is maintained/returned to baseline  Description: Monitor neurological and mental status including symptoms of increasing intercranial pressure (headache, nausea/vomiting, or change in behavior).  Hypertension (>180 systolic) may also indicate a change in status related to stroke.  Outcome: Monitoring/Evaluating progress  Goal: Normal temperature is maintained  Outcome: Monitoring/Evaluating progress  Goal: Elimination status is maintained/returned to baseline  Description: Remove indwelling urinary catheter as soon as possible or collaborate with provider for order/reason for continued use.   Outcome: Monitoring/Evaluating progress  Goal: #Depressive s/s (self-reported/observed) are recognized and monitored  Outcome: Monitoring/Evaluating progress  Goal: Personal stroke risk factors are identified with initial plan for risk reduction  Description: Stroke risk reduction involves taking medication, changing diet, increasing physical exercise, smoking cessation, or alcohol/drug use reduction/cessation based on identified risks.  Outcome: Monitoring/Evaluating progress  Goal: Verbalizes understanding of condition and treatment plan  Description: Document education using the patient education activity.  Outcome: Monitoring/Evaluating progress      "acetaminophen (TYLENOL) 500 MG tablet     albuterol (PROAIR HFA/PROVENTIL HFA/VENTOLIN HFA) 108 (90 BASE) MCG/ACT Inhaler     glycerin, adult, ([employer] GLYCERIN ADULT) 2 g SUPP Suppository     hypromellose-dextran 0.3-0.1% (ARTIFICIAL TEARS) opthalmic solution     order for DME     No current facility-administered medications for this visit.               Review of Systems:   ROS as described above.  Denies F/S/C/N/V/SOB/CP          Physical Exam:     Vitals:    09/04/18 1024 09/04/18 1026   BP: 176/75 166/73   Pulse: 65    Temp: 98.4  F (36.9  C)    TempSrc: Oral    SpO2: 97%    Weight: 99 lb 12.8 oz (45.3 kg)    Height: 4' 9.09\" (145 cm)    HC: 16 cm (6.3\")      Body mass index is 21.53 kg/(m^2).    GEN: patient sitting comfortably in NAD  HEEN: Head is atraumatic, normocephalic, eyes anicteric, mucous membranes moist  CV: RRR w/o M/R/G  PULM: CTAB without w/r/r  ABD: soft, nontender, bowel sounds present  NEURO: Alert and oriented x3.  No focal motor abnormalities.  Face symmetric.  PSYCH: appropriate  SKIN: No rashes, bruising, or other lesions    Results for orders placed or performed in visit on 08/23/18   Basic Metabolic Panel ([place]) - Results < 1 hr   Result Value Ref Range    Glucose 94.0 60.0 - 109.0 mg/dL    Urea Nitrogen 14.0 7.0 - 30.0 mg/dL    Creatinine 0.8 0.6 - 1.3 mg/dL    Sodium 142.0 133.0 - 144.0 mmol/L    Potassium 3.5 3.4 - 5.3 mmol/L    Chloride 108.0 94.0 - 109.0 mmol/L    Carbon Dioxide 30.0 20.0 - 32.0 mmol/L    Calcium 9.3 8.5 - 10.4 mg/dL    eGFR Calculated (Non Black Reference) 72.8 >60.0 mL/min    eGFR Calculated (Black Reference) 88.1 >60.0 mL/min       Assessment and Plan     1. Benign essential hypertension-increase amlodipine to 10mg daily, continue losartan.  Agree with split dosing  - amLODIPine (NORVASC) 5 MG tablet; Take 2 tablets (10 mg) by mouth daily  Dispense: 180 tablet; Refill: 1      RTC 2 weeks with Fabián to recheck BP and monitor renal funcition.  Patient and family " in agreement.    Cris  used at todays visit as patient speaks Cris only      Options for treatment and follow-up care were reviewed with the patient and/or guardian. [place] and/or guardian engaged in the decision making process and verbalized understanding of the options discussed and agreed with the final plan.    Angie Neves MD

## 2024-03-26 DIAGNOSIS — E87.6 HYPOKALEMIA: ICD-10-CM

## 2024-03-26 NOTE — TELEPHONE ENCOUNTER
Message to physician: faxed refill request    Date of last visit: 2/2/2024    Date of next visit if scheduled: None    Potassium   Date Value Ref Range Status   02/02/2024 3.8 3.4 - 5.3 mmol/L Final   06/29/2020 4.2 3.5 - 5.0 mmol/L Final     Creatinine   Date Value Ref Range Status   02/02/2024 0.98 (H) 0.51 - 0.95 mg/dL Final   06/29/2020 1.03 0.60 - 1.10 mg/dL Final     GFR Estimate   Date Value Ref Range Status   02/02/2024 55 (L) >60 mL/min/1.73m2 Final   06/29/2020 51 (L) >60 mL/min/1.73m2 Final       BP Readings from Last 3 Encounters:   02/02/24 124/60   02/02/24 105/58   01/16/24 (!) 201/75       Hemoglobin A1C   Date Value Ref Range Status   03/24/2015 5.5 3.5 - 6.0 % Final       Please complete refill and CLOSE ENCOUNTER.  Closing the encounter signifies the refill is complete.

## 2024-03-28 RX ORDER — POTASSIUM CHLORIDE 1500 MG/1
20 TABLET, EXTENDED RELEASE ORAL 2 TIMES DAILY
Qty: 60 TABLET | Refills: 3 | Status: SHIPPED | OUTPATIENT
Start: 2024-03-28

## 2024-12-24 ENCOUNTER — TELEPHONE (OUTPATIENT)
Dept: FAMILY MEDICINE | Facility: CLINIC | Age: 89
End: 2024-12-24
Payer: COMMERCIAL

## 2024-12-24 NOTE — TELEPHONE ENCOUNTER
Forms/Letter Request    Type of form/letter: DME: INCONTINENCE SUPPLY ORDER     Type of DME requested:    PULL UP BRIEFS SM 22/PK PREVAIL    Do we have the form/letter: Yes:     Who is the form from? [facility]     Where did/will the form come from? form was faxed in    When is form/letter needed by:     How would you like the form/letter returned: Fax : [phone]    Patient Notified form requests are processed in 5-7 business days:    Okay to leave a detailed message?:

## 2025-01-09 ENCOUNTER — MEDICAL CORRESPONDENCE (OUTPATIENT)
Dept: HEALTH INFORMATION MANAGEMENT | Facility: CLINIC | Age: OVER 89
End: 2025-01-09
Payer: COMMERCIAL

## 2025-08-08 ENCOUNTER — OFFICE VISIT (OUTPATIENT)
Dept: FAMILY MEDICINE | Facility: CLINIC | Age: OVER 89
End: 2025-08-08
Payer: COMMERCIAL

## 2025-08-08 VITALS
HEART RATE: 71 BPM | TEMPERATURE: 98.6 F | OXYGEN SATURATION: 92 % | SYSTOLIC BLOOD PRESSURE: 128 MMHG | DIASTOLIC BLOOD PRESSURE: 70 MMHG | RESPIRATION RATE: 16 BRPM

## 2025-08-08 DIAGNOSIS — J45.909 REACTIVE AIRWAY DISEASE WITH WHEEZING WITHOUT COMPLICATION, UNSPECIFIED ASTHMA SEVERITY, UNSPECIFIED WHETHER PERSISTENT: ICD-10-CM

## 2025-08-08 DIAGNOSIS — E87.6 HYPOKALEMIA: ICD-10-CM

## 2025-08-08 DIAGNOSIS — I10 BENIGN ESSENTIAL HYPERTENSION: Primary | ICD-10-CM

## 2025-08-08 DIAGNOSIS — R41.3 MEMORY IMPAIRMENT: ICD-10-CM

## 2025-08-08 RX ORDER — ALBUTEROL SULFATE 0.83 MG/ML
2.5 SOLUTION RESPIRATORY (INHALATION) EVERY 6 HOURS PRN
Qty: 90 ML | Refills: 1 | Status: SHIPPED | OUTPATIENT
Start: 2025-08-08

## 2025-08-08 ASSESSMENT — PATIENT HEALTH QUESTIONNAIRE - PHQ9
10. IF YOU CHECKED OFF ANY PROBLEMS, HOW DIFFICULT HAVE THESE PROBLEMS MADE IT FOR YOU TO DO YOUR WORK, TAKE CARE OF THINGS AT HOME, OR GET ALONG WITH OTHER PEOPLE: NOT DIFFICULT AT ALL
SUM OF ALL RESPONSES TO PHQ QUESTIONS 1-9: 3
SUM OF ALL RESPONSES TO PHQ QUESTIONS 1-9: 3

## 2025-08-09 LAB
ANION GAP SERPL CALCULATED.3IONS-SCNC: 13 MMOL/L (ref 7–15)
BUN SERPL-MCNC: 14.7 MG/DL (ref 8–23)
CALCIUM SERPL-MCNC: 8.1 MG/DL (ref 8.8–10.4)
CHLORIDE SERPL-SCNC: 109 MMOL/L (ref 98–107)
CHOLEST SERPL-MCNC: 155 MG/DL
CREAT SERPL-MCNC: 0.82 MG/DL (ref 0.51–0.95)
EGFRCR SERPLBLD CKD-EPI 2021: 68 ML/MIN/1.73M2
FASTING STATUS PATIENT QL REPORTED: NO
FASTING STATUS PATIENT QL REPORTED: NO
GLUCOSE SERPL-MCNC: 108 MG/DL (ref 70–99)
HCO3 SERPL-SCNC: 21 MMOL/L (ref 22–29)
HDLC SERPL-MCNC: 38 MG/DL
LDLC SERPL CALC-MCNC: 98 MG/DL
NONHDLC SERPL-MCNC: 117 MG/DL
POTASSIUM SERPL-SCNC: 2.8 MMOL/L (ref 3.4–5.3)
SODIUM SERPL-SCNC: 143 MMOL/L (ref 135–145)
TRIGL SERPL-MCNC: 94 MG/DL

## 2025-08-11 ENCOUNTER — APPOINTMENT (OUTPATIENT)
Dept: INTERPRETER SERVICES | Facility: CLINIC | Age: OVER 89
End: 2025-08-11
Payer: COMMERCIAL

## 2025-08-11 LAB
CREAT UR-MCNC: 55.8 MG/DL
MICROALBUMIN UR-MCNC: 64.1 MG/L
MICROALBUMIN/CREAT UR: 114.87 MG/G CR (ref 0–25)